# Patient Record
Sex: MALE | Race: WHITE | NOT HISPANIC OR LATINO | Employment: UNEMPLOYED | RURAL
[De-identification: names, ages, dates, MRNs, and addresses within clinical notes are randomized per-mention and may not be internally consistent; named-entity substitution may affect disease eponyms.]

---

## 2021-04-06 RX ORDER — COLCHICINE 0.6 MG/1
TABLET ORAL
Qty: 12 TABLET | Refills: 1 | Status: SHIPPED | OUTPATIENT
Start: 2021-04-06

## 2022-02-07 ENCOUNTER — HOSPITAL ENCOUNTER (OUTPATIENT)
Facility: HOSPITAL | Age: 67
Discharge: HOME | End: 2022-02-08
Attending: FAMILY MEDICINE | Admitting: HOSPITALIST
Payer: COMMERCIAL

## 2022-02-07 DIAGNOSIS — R07.9 CHEST PAIN: ICD-10-CM

## 2022-02-07 DIAGNOSIS — G56.00 CTS (CARPAL TUNNEL SYNDROME): ICD-10-CM

## 2022-02-07 DIAGNOSIS — E78.5 HYPERLIPIDEMIA, UNSPECIFIED HYPERLIPIDEMIA TYPE: ICD-10-CM

## 2022-02-07 DIAGNOSIS — R06.02 SOB (SHORTNESS OF BREATH): ICD-10-CM

## 2022-02-07 DIAGNOSIS — R07.9 CHEST PAIN AT REST: ICD-10-CM

## 2022-02-07 DIAGNOSIS — I25.10 CAD (CORONARY ARTERY DISEASE): ICD-10-CM

## 2022-02-07 DIAGNOSIS — G56.00 CARPAL TUNNEL SYNDROME, UNSPECIFIED LATERALITY: Primary | ICD-10-CM

## 2022-02-07 DIAGNOSIS — E78.5 HYPERLIPIDEMIA: ICD-10-CM

## 2022-02-07 DIAGNOSIS — I25.10 CORONARY ARTERY DISEASE, UNSPECIFIED VESSEL OR LESION TYPE, UNSPECIFIED WHETHER ANGINA PRESENT, UNSPECIFIED WHETHER NATIVE OR TRANSPLANTED HEART: ICD-10-CM

## 2022-02-07 LAB
ALBUMIN SERPL BCP-MCNC: 3.9 G/DL (ref 3.5–5)
ALBUMIN/GLOB SERPL: 1.1 {RATIO}
ALP SERPL-CCNC: 107 U/L (ref 45–115)
ALT SERPL W P-5'-P-CCNC: 71 U/L (ref 16–61)
ANION GAP SERPL CALCULATED.3IONS-SCNC: 14 MMOL/L (ref 7–16)
APTT PPP: 28.2 SECONDS (ref 25.2–37.3)
AST SERPL W P-5'-P-CCNC: 57 U/L (ref 15–37)
BASOPHILS # BLD AUTO: 0.06 K/UL (ref 0–0.2)
BASOPHILS NFR BLD AUTO: 1 % (ref 0–1)
BILIRUB SERPL-MCNC: 1 MG/DL (ref 0–1.2)
BILIRUB UR QL STRIP: ABNORMAL
BUN SERPL-MCNC: 13 MG/DL (ref 7–18)
BUN/CREAT SERPL: 11 (ref 6–20)
CALCIUM SERPL-MCNC: 9.1 MG/DL (ref 8.5–10.1)
CHLORIDE SERPL-SCNC: 104 MMOL/L (ref 98–107)
CLARITY UR: CLEAR
CO2 SERPL-SCNC: 25 MMOL/L (ref 21–32)
COLOR UR: YELLOW
CREAT SERPL-MCNC: 1.15 MG/DL (ref 0.7–1.3)
DIFFERENTIAL METHOD BLD: ABNORMAL
EOSINOPHIL # BLD AUTO: 0.16 K/UL (ref 0–0.5)
EOSINOPHIL NFR BLD AUTO: 2.7 % (ref 1–4)
ERYTHROCYTE [DISTWIDTH] IN BLOOD BY AUTOMATED COUNT: 12.7 % (ref 11.5–14.5)
FLUAV AG UPPER RESP QL IA.RAPID: NEGATIVE
FLUBV AG UPPER RESP QL IA.RAPID: NEGATIVE
GLOBULIN SER-MCNC: 3.7 G/DL (ref 2–4)
GLUCOSE SERPL-MCNC: 116 MG/DL (ref 74–106)
GLUCOSE UR STRIP-MCNC: NEGATIVE MG/DL
HCT VFR BLD AUTO: 46.2 % (ref 40–54)
HGB BLD-MCNC: 16.1 G/DL (ref 13.5–18)
IMM GRANULOCYTES # BLD AUTO: 0.01 K/UL (ref 0–0.04)
IMM GRANULOCYTES NFR BLD: 0.2 % (ref 0–0.4)
INR BLD: 1 (ref 0.9–1.1)
KETONES UR STRIP-SCNC: 15 MG/DL
LEUKOCYTE ESTERASE UR QL STRIP: NEGATIVE
LYMPHOCYTES # BLD AUTO: 1.44 K/UL (ref 1–4.8)
LYMPHOCYTES NFR BLD AUTO: 24 % (ref 27–41)
MAGNESIUM SERPL-MCNC: 2 MG/DL (ref 1.7–2.3)
MCH RBC QN AUTO: 31.9 PG (ref 27–31)
MCHC RBC AUTO-ENTMCNC: 34.8 G/DL (ref 32–36)
MCV RBC AUTO: 91.7 FL (ref 80–96)
MONOCYTES # BLD AUTO: 0.56 K/UL (ref 0–0.8)
MONOCYTES NFR BLD AUTO: 9.3 % (ref 2–6)
MPC BLD CALC-MCNC: 9.8 FL (ref 9.4–12.4)
NEUTROPHILS # BLD AUTO: 3.77 K/UL (ref 1.8–7.7)
NEUTROPHILS NFR BLD AUTO: 62.8 % (ref 53–65)
NITRITE UR QL STRIP: NEGATIVE
NRBC # BLD AUTO: 0 X10E3/UL
NRBC, AUTO (.00): 0 %
NT-PROBNP SERPL-MCNC: 98 PG/ML (ref 1–125)
PH UR STRIP: 5.5 PH UNITS
PLATELET # BLD AUTO: 146 K/UL (ref 150–400)
POTASSIUM SERPL-SCNC: 4 MMOL/L (ref 3.5–5.1)
PROT SERPL-MCNC: 7.6 G/DL (ref 6.4–8.2)
PROT UR QL STRIP: NEGATIVE
PROTHROMBIN TIME: 13.2 SECONDS (ref 11.7–14.7)
RBC # BLD AUTO: 5.04 M/UL (ref 4.6–6.2)
RBC # UR STRIP: NEGATIVE /UL
SARS-COV+SARS-COV-2 AG RESP QL IA.RAPID: NEGATIVE
SODIUM SERPL-SCNC: 139 MMOL/L (ref 136–145)
SP GR UR STRIP: >=1.03
TROPONIN I SERPL HS-MCNC: 6 PG/ML
TROPONIN I SERPL HS-MCNC: 6.1 PG/ML
TROPONIN I SERPL HS-MCNC: 6.6 PG/ML
UROBILINOGEN UR STRIP-ACNC: 0.2 MG/DL
WBC # BLD AUTO: 6 K/UL (ref 4.5–11)

## 2022-02-07 PROCEDURE — 85025 COMPLETE CBC W/AUTO DIFF WBC: CPT | Performed by: FAMILY MEDICINE

## 2022-02-07 PROCEDURE — 93010 ELECTROCARDIOGRAM REPORT: CPT | Mod: ,,, | Performed by: INTERNAL MEDICINE

## 2022-02-07 PROCEDURE — 99220 PR INITIAL OBSERVATION CARE,LEVL III: ICD-10-PCS | Mod: ,,, | Performed by: HOSPITALIST

## 2022-02-07 PROCEDURE — 81003 URINALYSIS AUTO W/O SCOPE: CPT | Performed by: REGISTERED NURSE

## 2022-02-07 PROCEDURE — 99284 EMERGENCY DEPT VISIT MOD MDM: CPT | Mod: ,,, | Performed by: FAMILY MEDICINE

## 2022-02-07 PROCEDURE — 99284 PR EMERGENCY DEPT VISIT,LEVEL IV: ICD-10-PCS | Mod: ,,, | Performed by: FAMILY MEDICINE

## 2022-02-07 PROCEDURE — 83880 ASSAY OF NATRIURETIC PEPTIDE: CPT | Performed by: FAMILY MEDICINE

## 2022-02-07 PROCEDURE — 63600175 PHARM REV CODE 636 W HCPCS: Performed by: REGISTERED NURSE

## 2022-02-07 PROCEDURE — 99220 PR INITIAL OBSERVATION CARE,LEVL III: CPT | Mod: ,,, | Performed by: HOSPITALIST

## 2022-02-07 PROCEDURE — 99285 EMERGENCY DEPT VISIT HI MDM: CPT | Mod: 25 | Performed by: FAMILY MEDICINE

## 2022-02-07 PROCEDURE — 87428 SARSCOV & INF VIR A&B AG IA: CPT | Performed by: FAMILY MEDICINE

## 2022-02-07 PROCEDURE — 83735 ASSAY OF MAGNESIUM: CPT | Performed by: FAMILY MEDICINE

## 2022-02-07 PROCEDURE — G0378 HOSPITAL OBSERVATION PER HR: HCPCS

## 2022-02-07 PROCEDURE — 96372 THER/PROPH/DIAG INJ SC/IM: CPT | Performed by: REGISTERED NURSE

## 2022-02-07 PROCEDURE — 93005 ELECTROCARDIOGRAM TRACING: CPT

## 2022-02-07 PROCEDURE — 25000003 PHARM REV CODE 250: Performed by: FAMILY MEDICINE

## 2022-02-07 PROCEDURE — 85730 THROMBOPLASTIN TIME PARTIAL: CPT | Performed by: REGISTERED NURSE

## 2022-02-07 PROCEDURE — 36415 COLL VENOUS BLD VENIPUNCTURE: CPT | Performed by: FAMILY MEDICINE

## 2022-02-07 PROCEDURE — 84484 ASSAY OF TROPONIN QUANT: CPT | Performed by: FAMILY MEDICINE

## 2022-02-07 PROCEDURE — 85610 PROTHROMBIN TIME: CPT | Performed by: REGISTERED NURSE

## 2022-02-07 PROCEDURE — 84484 ASSAY OF TROPONIN QUANT: CPT | Mod: 91 | Performed by: REGISTERED NURSE

## 2022-02-07 PROCEDURE — 93010 EKG 12-LEAD: ICD-10-PCS | Mod: 76,,, | Performed by: INTERNAL MEDICINE

## 2022-02-07 PROCEDURE — 80053 COMPREHEN METABOLIC PANEL: CPT | Performed by: FAMILY MEDICINE

## 2022-02-07 RX ORDER — ATORVASTATIN CALCIUM 40 MG/1
40 TABLET, FILM COATED ORAL DAILY
Status: DISCONTINUED | OUTPATIENT
Start: 2022-02-08 | End: 2022-02-08

## 2022-02-07 RX ORDER — ASPIRIN 325 MG
325 TABLET ORAL
Status: COMPLETED | OUTPATIENT
Start: 2022-02-07 | End: 2022-02-07

## 2022-02-07 RX ORDER — HYDROCODONE BITARTRATE AND ACETAMINOPHEN 5; 325 MG/1; MG/1
1 TABLET ORAL EVERY 4 HOURS PRN
Status: DISCONTINUED | OUTPATIENT
Start: 2022-02-07 | End: 2022-02-09 | Stop reason: HOSPADM

## 2022-02-07 RX ORDER — ACETAMINOPHEN 500 MG
1000 TABLET ORAL EVERY 8 HOURS PRN
Status: DISCONTINUED | OUTPATIENT
Start: 2022-02-07 | End: 2022-02-09 | Stop reason: HOSPADM

## 2022-02-07 RX ORDER — NITROGLYCERIN 0.4 MG/1
0.4 TABLET SUBLINGUAL EVERY 5 MIN PRN
Status: DISCONTINUED | OUTPATIENT
Start: 2022-02-07 | End: 2022-02-09 | Stop reason: HOSPADM

## 2022-02-07 RX ORDER — ENOXAPARIN SODIUM 100 MG/ML
40 INJECTION SUBCUTANEOUS ONCE
Status: COMPLETED | OUTPATIENT
Start: 2022-02-07 | End: 2022-02-07

## 2022-02-07 RX ORDER — SODIUM CHLORIDE 0.9 % (FLUSH) 0.9 %
3 SYRINGE (ML) INJECTION
Status: DISCONTINUED | OUTPATIENT
Start: 2022-02-07 | End: 2022-02-09 | Stop reason: HOSPADM

## 2022-02-07 RX ORDER — ONDANSETRON 2 MG/ML
4 INJECTION INTRAMUSCULAR; INTRAVENOUS EVERY 8 HOURS PRN
Status: DISCONTINUED | OUTPATIENT
Start: 2022-02-07 | End: 2022-02-09 | Stop reason: HOSPADM

## 2022-02-07 RX ORDER — TALC
6 POWDER (GRAM) TOPICAL NIGHTLY PRN
Status: DISCONTINUED | OUTPATIENT
Start: 2022-02-07 | End: 2022-02-09 | Stop reason: HOSPADM

## 2022-02-07 RX ADMIN — ENOXAPARIN SODIUM 40 MG: 40 INJECTION SUBCUTANEOUS at 07:02

## 2022-02-07 RX ADMIN — ASPIRIN 325 MG ORAL TABLET 325 MG: 325 PILL ORAL at 03:02

## 2022-02-07 NOTE — ED PROVIDER NOTES
Encounter Date: 2/7/2022    SCRIBE #1 NOTE: I, Bre Angela, am scribing for, and in the presence of,  Kary Garcia MD. I have scribed the entire note.       History     Chief Complaint   Patient presents with    Shortness of Breath    Nausea     Patient is a 66 year old male who presents to the emergency department due to sudden onset diaphoresis and nausea that onset 40 minutes prior to arrival. Patient explains that he woke up and began to feel some neck soreness and assuming that he just slept on it wrong he took some tylenol, ate breakfast, and continued to rest. After eating lunch this afternoon the patient went to the restroom where he began to experience diarrhea. He then started to feel nauseated, clammy, experiencing rapid breathing, and felt his arms tingle. Patient called his wife, Dr. Rayo, who advised him to come into the ED. Patient has a surgical history of a quadruple bypass surgery that occurred 10+ years ago. He explains that the symptoms he felt today are not similar to that he experienced prior to the bypass.  Patient takes a baby Asprin every few days. Patient recently moved to the [facility] and has yet to find a new cardiologist.     The history is provided by the patient. No  was used.     Review of patient's allergies indicates:  No Known Allergies  Past Medical History:   Diagnosis Date    Hyperlipidemia     Hypertension      Past Surgical History:   Procedure Laterality Date    ACHILLES TENDON SURGERY      CORONARY ARTERY BYPASS GRAFT      TONSILLECTOMY       Family History   Problem Relation Age of Onset    Heart attack Father      Social History     Tobacco Use    Smoking status: Former Smoker    Smokeless tobacco: Never Used   Substance Use Topics    Alcohol use: Yes    Drug use: No     Review of Systems   Constitutional: Positive for diaphoresis.   HENT: Negative.    Eyes: Negative.    Respiratory:        Rapid breathing   Cardiovascular:  Negative.    Gastrointestinal: Positive for diarrhea and nausea. Negative for abdominal pain.   Endocrine: Negative.    Genitourinary: Negative.    Musculoskeletal: Negative.    Skin: Negative.    Allergic/Immunologic: Negative.    Neurological:        Tingling in arms   Hematological: Negative.    Psychiatric/Behavioral: Negative.    All other systems reviewed and are negative.      Physical Exam     Initial Vitals [02/07/22 1454]   BP Pulse Resp Temp SpO2   128/73 (!) 59 16 98.7 °F (37.1 °C) 95 %      MAP       --         Physical Exam    Vitals reviewed.  Constitutional: He appears well-developed and well-nourished. No distress.   HENT:   Head: Normocephalic.   Eyes: Conjunctivae are normal. Pupils are equal, round, and reactive to light.   Cardiovascular: Normal rate, regular rhythm, normal heart sounds and intact distal pulses.   Pulmonary/Chest: Breath sounds normal.   Abdominal: Abdomen is soft. Bowel sounds are normal.     Neurological: He is alert and oriented to person, place, and time.   Skin: Skin is warm and dry.   Psychiatric: He has a normal mood and affect.         ED Course   Procedures  Labs Reviewed   COMPREHENSIVE METABOLIC PANEL - Abnormal; Notable for the following components:       Result Value    Glucose 116 (*)     ALT 71 (*)     AST 57 (*)     All other components within normal limits   CBC WITH DIFFERENTIAL - Abnormal; Notable for the following components:    MCH 31.9 (*)     Platelet Count 146 (*)     Lymphocytes % 24.0 (*)     Monocytes % 9.3 (*)     All other components within normal limits   TROPONIN I - Normal   NT-PRO NATRIURETIC PEPTIDE - Normal   MAGNESIUM - Normal   CBC W/ AUTO DIFFERENTIAL    Narrative:     The following orders were created for panel order CBC auto differential.  Procedure                               Abnormality         Status                     ---------                               -----------         ------                     CBC with  Differential[[id number]]        Abnormal            Final result                 Please view results for these tests on the individual orders.   EXTRA TUBES    Narrative:     The following orders were created for panel order EXTRA TUBES.  Procedure                               Abnormality         Status                     ---------                               -----------         ------                     Red Top Hold[[id number]]                                     In process                   Please view results for these tests on the individual orders.   RED TOP HOLD   TROPONIN I   SARS-COV2 (COVID) W/ FLU ANTIGEN          Imaging Results          X-Ray Chest AP Portable (Final result)  Result time 02/07/22 15:06:11    Final result by Jack Grande MD (02/07/22 15:06:11)                 Impression:      No acute cardiopulmonary process      Electronically signed by: Jack Grande  Date:    02/07/2022  Time:    15:06             Narrative:    EXAMINATION:  XR CHEST AP PORTABLE    CLINICAL HISTORY:  Chest Pain;.    TECHNIQUE:  AP portable erect chest    COMPARISON:  No previous similar    FINDINGS:  Cardiac silhouette is not enlarged.  There is no mediastinal mass.  Prior median sternotomy.  There is no pulmonary vascular engorgement.    Lungs and pleural spaces are clear.    There is no acute osseous abnormality                                 Medications   aspirin tablet 325 mg (325 mg Oral Given 2/7/22 1540)     Medical Decision Making:   Other:   I have discussed this case with another health care provider.       <> Summary of the Discussion: I discussed case with Dr. Beauchamp who believes patient needs a heart catheterization tomorrow.  Discussed case with Dr. Kasper who agrees to admission.  Patient cannot tolerate a beta-blocker per wife who is also physician.  He is currently asymptomatic.             Attending Attestation:           Physician Attestation for Scribe:  Physician Attestation  Statement for Scribe #1: I, Kary Garcia M.D., reviewed documentation, as scribed by Bre Miller in my presence, and it is both accurate and complete.                      Clinical Impression:   Final diagnoses:  [R07.9] Chest pain          ED Disposition Condition    Observation               Kary Chpaarro MD  02/07/22 8349

## 2022-02-07 NOTE — Clinical Note
180 ml of contrast were injected throughout the case. 20 mL of contrast was the total wasted during the case. 200 mL was the total amount used during the case.

## 2022-02-07 NOTE — Clinical Note
The catheter was repositioned into the ostial SVG graft. An angiography was performed of the m-d RCA. Multiple views were taken.

## 2022-02-07 NOTE — Clinical Note
The catheter was inserted over the wire into the aorta. The catheter was unable to access the area..RCA graft. Removed over the wire.

## 2022-02-07 NOTE — Clinical Note
The catheter was inserted over the wire into the aorta. An angiography was performed of the aorta. The angiography was performed via power injection. The injected amount was 45 mL contrast at 14 mL/s. The PSI from the power injection was 400. Catheter removed over wire.

## 2022-02-07 NOTE — Clinical Note
The catheter was reinserted into the ostial LYUBOV graft. An angiography was performed of the Free LYUBOV. Multiple views were taken.

## 2022-02-07 NOTE — Clinical Note
The catheter was inserted over the wire into the left ventricle. Hemodynamics were performed.  and Pullback was recorded.  Catheter was then removed.

## 2022-02-07 NOTE — Clinical Note
The catheter was repositioned into the ostial LIMA graft. An angiography was performed of the m-d LAD.

## 2022-02-07 NOTE — ED TRIAGE NOTES
Presents to ED via EMS from home for c/o SOB, nausea and bilateral arm tingling that began at home while using the bathroom. Zofran given by EMS and patient reports symptoms have resolved.

## 2022-02-07 NOTE — Clinical Note
The catheter was inserted over the wire into the ostium   left main. An angiography was performed of the left coronary arteries. Multiple views were taken. Catheter was then removed.

## 2022-02-07 NOTE — Clinical Note
The catheter was repositioned into the aorta. The catheter was unable to engage the area..RCA graft. Catheter removed over wire.

## 2022-02-07 NOTE — Clinical Note
The right groin was prepped. The site was prepped with ChloraPrep. The site was clipped. The patient was draped. The patient was positioned supine.

## 2022-02-07 NOTE — Clinical Note
A percutaneous stick to the right femoral artery was performed. Ultrasound guidance was used to obtain access.

## 2022-02-07 NOTE — Clinical Note
The catheter was repositioned into the aorta. The catheter was unable to engage the area..RCA graft. Removed over wire.

## 2022-02-07 NOTE — Clinical Note
The catheter was repositioned into the right subclavian artery LYUBOV graft. An angiography was performed of the Free LYUBOV. Multiple views were taken.

## 2022-02-08 VITALS
TEMPERATURE: 98 F | HEART RATE: 56 BPM | HEIGHT: 72 IN | DIASTOLIC BLOOD PRESSURE: 67 MMHG | WEIGHT: 220 LBS | BODY MASS INDEX: 29.8 KG/M2 | RESPIRATION RATE: 18 BRPM | OXYGEN SATURATION: 96 % | SYSTOLIC BLOOD PRESSURE: 123 MMHG

## 2022-02-08 PROBLEM — G56.00 CTS (CARPAL TUNNEL SYNDROME): Status: ACTIVE | Noted: 2022-02-08

## 2022-02-08 LAB
ALBUMIN SERPL BCP-MCNC: 3.6 G/DL (ref 3.5–5)
ALBUMIN/GLOB SERPL: 1.1 {RATIO}
ALP SERPL-CCNC: 100 U/L (ref 45–115)
ALT SERPL W P-5'-P-CCNC: 59 U/L (ref 16–61)
ANION GAP SERPL CALCULATED.3IONS-SCNC: 14 MMOL/L (ref 7–16)
AST SERPL W P-5'-P-CCNC: 37 U/L (ref 15–37)
AV INDEX (PROSTH): 0.64
AV VALVE AREA: 2.89 CM2
BASOPHILS # BLD AUTO: 0.06 K/UL (ref 0–0.2)
BASOPHILS NFR BLD AUTO: 1.1 % (ref 0–1)
BILIRUB SERPL-MCNC: 1.2 MG/DL (ref 0–1.2)
BSA FOR ECHO PROCEDURE: 2.25 M2
BUN SERPL-MCNC: 13 MG/DL (ref 7–18)
BUN/CREAT SERPL: 12 (ref 6–20)
CALCIUM SERPL-MCNC: 9 MG/DL (ref 8.5–10.1)
CHLORIDE SERPL-SCNC: 105 MMOL/L (ref 98–107)
CHOLEST SERPL-MCNC: 182 MG/DL (ref 0–200)
CHOLEST/HDLC SERPL: 3.4 {RATIO}
CO2 SERPL-SCNC: 26 MMOL/L (ref 21–32)
CREAT SERPL-MCNC: 1.13 MG/DL (ref 0.7–1.3)
CV ECHO LV RWT: 0.44 CM
DIFFERENTIAL METHOD BLD: ABNORMAL
DOP CALC AO VTI: 28.93 CM
DOP CALC LVOT AREA: 4.5 CM2
DOP CALC LVOT DIAMETER: 2.4 CM
DOP CALC LVOT STROKE VOLUME: 83.69 CM3
DOP CALCLVOT PEAK VEL VTI: 18.51 CM
ECHO LV POSTERIOR WALL: 1.1 CM (ref 0.6–1.1)
EJECTION FRACTION: 50 %
EOSINOPHIL # BLD AUTO: 0.18 K/UL (ref 0–0.5)
EOSINOPHIL NFR BLD AUTO: 3.2 % (ref 1–4)
ERYTHROCYTE [DISTWIDTH] IN BLOOD BY AUTOMATED COUNT: 12.8 % (ref 11.5–14.5)
FRACTIONAL SHORTENING: 22 % (ref 28–44)
GLOBULIN SER-MCNC: 3.2 G/DL (ref 2–4)
GLUCOSE SERPL-MCNC: 103 MG/DL (ref 74–106)
HCT VFR BLD AUTO: 44.4 % (ref 40–54)
HDLC SERPL-MCNC: 53 MG/DL (ref 40–60)
HGB BLD-MCNC: 15.3 G/DL (ref 13.5–18)
IMM GRANULOCYTES # BLD AUTO: 0.01 K/UL (ref 0–0.04)
IMM GRANULOCYTES NFR BLD: 0.2 % (ref 0–0.4)
INTERVENTRICULAR SEPTUM: 1.1 CM (ref 0.6–1.1)
IVC DIAMETER: 1.7 CM
LDLC SERPL CALC-MCNC: 107 MG/DL
LDLC/HDLC SERPL: 2 {RATIO}
LEFT ATRIUM SIZE: 5.2 CM
LEFT INTERNAL DIMENSION IN SYSTOLE: 3.9 CM (ref 2.1–4)
LEFT VENTRICLE MASS INDEX: 93 G/M2
LEFT VENTRICULAR INTERNAL DIMENSION IN DIASTOLE: 5 CM (ref 3.5–6)
LEFT VENTRICULAR MASS: 207.14 G
LYMPHOCYTES # BLD AUTO: 2.11 K/UL (ref 1–4.8)
LYMPHOCYTES NFR BLD AUTO: 37.8 % (ref 27–41)
MCH RBC QN AUTO: 31.9 PG (ref 27–31)
MCHC RBC AUTO-ENTMCNC: 34.5 G/DL (ref 32–36)
MCV RBC AUTO: 92.7 FL (ref 80–96)
MONOCYTES # BLD AUTO: 0.55 K/UL (ref 0–0.8)
MONOCYTES NFR BLD AUTO: 9.9 % (ref 2–6)
MPC BLD CALC-MCNC: 10.2 FL (ref 9.4–12.4)
NEUTROPHILS # BLD AUTO: 2.67 K/UL (ref 1.8–7.7)
NEUTROPHILS NFR BLD AUTO: 47.8 % (ref 53–65)
NONHDLC SERPL-MCNC: 129 MG/DL
NRBC # BLD AUTO: 0 X10E3/UL
NRBC, AUTO (.00): 0 %
PLATELET # BLD AUTO: 147 K/UL (ref 150–400)
POTASSIUM SERPL-SCNC: 4.6 MMOL/L (ref 3.5–5.1)
PROT SERPL-MCNC: 6.8 G/DL (ref 6.4–8.2)
RBC # BLD AUTO: 4.79 M/UL (ref 4.6–6.2)
SODIUM SERPL-SCNC: 140 MMOL/L (ref 136–145)
T4 SERPL-MCNC: 7.2 ΜG/DL (ref 4.5–12.1)
TRIGL SERPL-MCNC: 109 MG/DL (ref 35–150)
TROPONIN I SERPL HS-MCNC: 6.3 PG/ML
TSH SERPL DL<=0.005 MIU/L-ACNC: 2.54 UIU/ML (ref 0.36–3.74)
VLDLC SERPL-MCNC: 22 MG/DL
WBC # BLD AUTO: 5.58 K/UL (ref 4.5–11)

## 2022-02-08 PROCEDURE — 27000080 OPTIME MED/SURG SUP & DEVICES GENERAL CLASSIFICATION: Performed by: STUDENT IN AN ORGANIZED HEALTH CARE EDUCATION/TRAINING PROGRAM

## 2022-02-08 PROCEDURE — 99152 MOD SED SAME PHYS/QHP 5/>YRS: CPT | Performed by: STUDENT IN AN ORGANIZED HEALTH CARE EDUCATION/TRAINING PROGRAM

## 2022-02-08 PROCEDURE — 93459 L HRT ART/GRFT ANGIO: CPT | Mod: 26,,, | Performed by: STUDENT IN AN ORGANIZED HEALTH CARE EDUCATION/TRAINING PROGRAM

## 2022-02-08 PROCEDURE — 80061 LIPID PANEL: CPT | Performed by: REGISTERED NURSE

## 2022-02-08 PROCEDURE — 93458 L HRT ARTERY/VENTRICLE ANGIO: CPT | Performed by: STUDENT IN AN ORGANIZED HEALTH CARE EDUCATION/TRAINING PROGRAM

## 2022-02-08 PROCEDURE — 93459 L HRT ART/GRFT ANGIO: CPT | Performed by: STUDENT IN AN ORGANIZED HEALTH CARE EDUCATION/TRAINING PROGRAM

## 2022-02-08 PROCEDURE — 27100168 OPTIME MED/SURG SUP & DEVICES NON-STERILE SUPPLY: Performed by: STUDENT IN AN ORGANIZED HEALTH CARE EDUCATION/TRAINING PROGRAM

## 2022-02-08 PROCEDURE — 84484 ASSAY OF TROPONIN QUANT: CPT | Performed by: REGISTERED NURSE

## 2022-02-08 PROCEDURE — 99217 PR OBSERVATION CARE DISCHARGE: ICD-10-PCS | Mod: ,,, | Performed by: HOSPITALIST

## 2022-02-08 PROCEDURE — C1760 CLOSURE DEV, VASC: HCPCS | Performed by: STUDENT IN AN ORGANIZED HEALTH CARE EDUCATION/TRAINING PROGRAM

## 2022-02-08 PROCEDURE — 27800903 OPTIME MED/SURG SUP & DEVICES OTHER IMPLANTS: Performed by: STUDENT IN AN ORGANIZED HEALTH CARE EDUCATION/TRAINING PROGRAM

## 2022-02-08 PROCEDURE — 99217 PR OBSERVATION CARE DISCHARGE: CPT | Mod: ,,, | Performed by: HOSPITALIST

## 2022-02-08 PROCEDURE — 25000003 PHARM REV CODE 250: Performed by: HOSPITALIST

## 2022-02-08 PROCEDURE — 84436 ASSAY OF TOTAL THYROXINE: CPT | Performed by: HOSPITALIST

## 2022-02-08 PROCEDURE — 80053 COMPREHEN METABOLIC PANEL: CPT | Performed by: REGISTERED NURSE

## 2022-02-08 PROCEDURE — 85025 COMPLETE CBC W/AUTO DIFF WBC: CPT | Performed by: REGISTERED NURSE

## 2022-02-08 PROCEDURE — 25000003 PHARM REV CODE 250: Performed by: FAMILY MEDICINE

## 2022-02-08 PROCEDURE — 93459: ICD-10-PCS | Mod: 26,,, | Performed by: STUDENT IN AN ORGANIZED HEALTH CARE EDUCATION/TRAINING PROGRAM

## 2022-02-08 PROCEDURE — 84443 ASSAY THYROID STIM HORMONE: CPT | Performed by: HOSPITALIST

## 2022-02-08 PROCEDURE — 25000003 PHARM REV CODE 250: Performed by: STUDENT IN AN ORGANIZED HEALTH CARE EDUCATION/TRAINING PROGRAM

## 2022-02-08 PROCEDURE — 99153 MOD SED SAME PHYS/QHP EA: CPT | Performed by: STUDENT IN AN ORGANIZED HEALTH CARE EDUCATION/TRAINING PROGRAM

## 2022-02-08 PROCEDURE — G0378 HOSPITAL OBSERVATION PER HR: HCPCS

## 2022-02-08 PROCEDURE — 25500020 PHARM REV CODE 255: Performed by: STUDENT IN AN ORGANIZED HEALTH CARE EDUCATION/TRAINING PROGRAM

## 2022-02-08 PROCEDURE — 25000003 PHARM REV CODE 250: Performed by: REGISTERED NURSE

## 2022-02-08 PROCEDURE — 63600175 PHARM REV CODE 636 W HCPCS: Performed by: STUDENT IN AN ORGANIZED HEALTH CARE EDUCATION/TRAINING PROGRAM

## 2022-02-08 PROCEDURE — C1894 INTRO/SHEATH, NON-LASER: HCPCS | Performed by: STUDENT IN AN ORGANIZED HEALTH CARE EDUCATION/TRAINING PROGRAM

## 2022-02-08 PROCEDURE — 36415 COLL VENOUS BLD VENIPUNCTURE: CPT | Performed by: REGISTERED NURSE

## 2022-02-08 PROCEDURE — S5010 5% DEXTROSE AND 0.45% SALINE: HCPCS | Performed by: HOSPITALIST

## 2022-02-08 PROCEDURE — 27201423 OPTIME MED/SURG SUP & DEVICES STERILE SUPPLY: Performed by: STUDENT IN AN ORGANIZED HEALTH CARE EDUCATION/TRAINING PROGRAM

## 2022-02-08 RX ORDER — EZETIMIBE 10 MG/1
10 TABLET ORAL NIGHTLY
Status: DISCONTINUED | OUTPATIENT
Start: 2022-02-08 | End: 2022-02-08

## 2022-02-08 RX ORDER — LIDOCAINE HYDROCHLORIDE 10 MG/ML
INJECTION INFILTRATION; PERINEURAL
Status: DISCONTINUED | OUTPATIENT
Start: 2022-02-08 | End: 2022-02-08 | Stop reason: HOSPADM

## 2022-02-08 RX ORDER — SODIUM CHLORIDE 450 MG/100ML
INJECTION, SOLUTION INTRAVENOUS
Status: DISCONTINUED | OUTPATIENT
Start: 2022-02-08 | End: 2022-02-09 | Stop reason: HOSPADM

## 2022-02-08 RX ORDER — HYDRALAZINE HYDROCHLORIDE 20 MG/ML
INJECTION INTRAMUSCULAR; INTRAVENOUS
Status: DISCONTINUED | OUTPATIENT
Start: 2022-02-08 | End: 2022-02-08 | Stop reason: HOSPADM

## 2022-02-08 RX ORDER — MIDAZOLAM HYDROCHLORIDE 1 MG/ML
INJECTION INTRAMUSCULAR; INTRAVENOUS
Status: DISCONTINUED | OUTPATIENT
Start: 2022-02-08 | End: 2022-02-08 | Stop reason: HOSPADM

## 2022-02-08 RX ORDER — NAPROXEN SODIUM 220 MG/1
81 TABLET, FILM COATED ORAL DAILY
Status: DISCONTINUED | OUTPATIENT
Start: 2022-02-08 | End: 2022-02-09 | Stop reason: HOSPADM

## 2022-02-08 RX ORDER — EZETIMIBE 10 MG/1
10 TABLET ORAL NIGHTLY
Status: DISCONTINUED | OUTPATIENT
Start: 2022-02-08 | End: 2022-02-09 | Stop reason: HOSPADM

## 2022-02-08 RX ORDER — DEXTROSE MONOHYDRATE AND SODIUM CHLORIDE 5; .45 G/100ML; G/100ML
INJECTION, SOLUTION INTRAVENOUS CONTINUOUS
Status: DISCONTINUED | OUTPATIENT
Start: 2022-02-08 | End: 2022-02-09 | Stop reason: HOSPADM

## 2022-02-08 RX ORDER — FENTANYL CITRATE 50 UG/ML
INJECTION, SOLUTION INTRAMUSCULAR; INTRAVENOUS
Status: DISCONTINUED | OUTPATIENT
Start: 2022-02-08 | End: 2022-02-08 | Stop reason: HOSPADM

## 2022-02-08 RX ORDER — EZETIMIBE 10 MG/1
10 TABLET ORAL NIGHTLY
Qty: 30 TABLET | Refills: 0 | Status: SHIPPED | OUTPATIENT
Start: 2022-02-08 | End: 2023-02-08

## 2022-02-08 RX ADMIN — ASPIRIN 81 MG 81 MG: 81 TABLET ORAL at 02:02

## 2022-02-08 RX ADMIN — ATORVASTATIN CALCIUM 40 MG: 40 TABLET, FILM COATED ORAL at 08:02

## 2022-02-08 RX ADMIN — DEXTROSE AND SODIUM CHLORIDE: 5; 450 INJECTION, SOLUTION INTRAVENOUS at 02:02

## 2022-02-08 RX ADMIN — EZETIMIBE 10 MG: 10 TABLET ORAL at 09:02

## 2022-02-08 NOTE — ASSESSMENT & PLAN NOTE
65 y/o male with a past medical history of CAD ( S/P CABG 2011) HLD, admitted to from [facility] ED complaining of a sudden onset of diaphoresis and nausea.  His symptoms are similar to those he experienced when he was found to have CAD requiring Cabg. Cardiology consulted and plans [facility] in a.m. Troponin negative in ED 6.1. Repeat troponin. Echo pending. Pt reports no BB currently because he can't tolerate them. HR currently 55 in ed.  mg in ed.  * Telemetry Monitoring    * Routine vitals    * Cardiology consulted:    * NPO after midnight   * CBC, CMP, Troponin x3, Lipids, Pt/Inr, Ptt   * Cxr: There is no acute osseous abnormality  * 02 prn maintain sPO2 >92%    * Dvt ppx ; Lovenox 40 mg once  * NTG  Prn for chest pain  *  mg in ED  * Atorvastatin 80 mg qhs   * Hx of CAD: s/p CABG about 10 years ago [facility]  * Echo pending   * Lipid panel ordered   * EKG :    # AUGUSTIN score of 4

## 2022-02-08 NOTE — PROGRESS NOTES
[address] Medicine  Progress Note    Patient Name: Antoni Rayo  MRN: [id number]  Patient Class: OP- Observation   Admission Date: 2/7/2022  Length of Stay: 0 days  Attending Physician: Shimon Kasper MD  Primary Care Provider: Primary Doctor No        Subjective:     Principal Problem:CAD (coronary artery disease)        HPI:  65 y/o male with a past medical history of CAD ( S/P CABG 2011) HLD, admitted to from [facility] ED complaining of a sudden onset of diaphoresis and nausea. The pt reports this morning after waking up he felt soreness in his neck and took Tylenol for his discomfort. He assumed his soreness was from sleeping wrong last night. This afternoon he ate lunch he went to the restroom and had one episode of diarrhea with nausea and diaphoresis. He also reports tachypnea and numbness in his arms. He states his symptoms were similar to what he felt before he had his CABG. He denies SOB, chest pain, abdominal pain, headache, vision disturbance, dizziness or recent illness. Upon exam the pt denies SOB, CP or nausea. He states he has had no other episodes similar to earlier. His wife, who is a physician at [facility], is at bedside and reports his symptoms are similar to when he was found to have CAD requiring CABG. She reports he had a negative LHC about one year after his CABG. He was followed by Cardiologist  in [place] and Dr. Kasper at [place] however, he has not had a established heart doctor since Dr. Kasper's death. The pt reports alcohol use daily. Denies tobacco use. He is vaccinated for Covid.                   Overview/Hospital Course:  No notes on file    Interval History: Pt denies SOB, CP or any symptoms similar to what brought him in.     Review of Systems   Constitutional: Negative for fever and unexpected weight change.   HENT: Negative for congestion.    Eyes: Negative for visual disturbance.   Respiratory: Negative for chest tightness and  shortness of breath.    Cardiovascular: Negative for chest pain, palpitations and leg swelling.   Gastrointestinal: Negative for abdominal pain, blood in stool and vomiting.   Neurological: Negative for dizziness, syncope, facial asymmetry, speech difficulty, light-headedness and headaches.   All other systems reviewed and are negative.    Objective:     Vital Signs (Most Recent):  Temp: 98.3 °F (36.8 °C) (02/08/22 0730)  Pulse: (!) 54 (02/08/22 0730)  Resp: 18 (02/08/22 0730)  BP: (!) 148/80 (02/08/22 0730)  SpO2: 97 % (02/08/22 0730) Vital Signs (24h Range):  Temp:  [97.8 °F (36.6 °C)-98.7 °F (37.1 °C)] 98.3 °F (36.8 °C)  Pulse:  [52-73] 54  Resp:  [12-24] 18  SpO2:  [94 %-99 %] 97 %  BP: (118-153)/(57-81) 148/80     Weight: 99.8 kg (220 lb 0.3 oz)  Body mass index is 29.84 kg/m².    Intake/Output Summary (Last 24 hours) at 2/8/2022 0940  Last data filed at 2/8/2022 0700  Gross per 24 hour   Intake 576.25 ml   Output --   Net 576.25 ml      Physical Exam  Vitals and nursing note reviewed.   Constitutional:       General: He is not in acute distress.     Appearance: Normal appearance. He is obese.   HENT:      Head: Normocephalic and atraumatic.      Nose: Nose normal.      Mouth/Throat:      Mouth: Mucous membranes are moist.   Eyes:      Extraocular Movements: Extraocular movements intact.      Pupils: Pupils are equal, round, and reactive to light.   Cardiovascular:      Rate and Rhythm: Normal rate and regular rhythm.      Pulses: Normal pulses.      Heart sounds: Normal heart sounds. No murmur heard.      Pulmonary:      Effort: Pulmonary effort is normal. No respiratory distress.      Breath sounds: Normal breath sounds.   Abdominal:      General: Bowel sounds are normal. There is no distension.      Palpations: Abdomen is soft.      Tenderness: There is no abdominal tenderness.   Musculoskeletal:         General: No swelling or signs of injury. Normal range of motion.      Cervical back: Normal range of  motion and neck supple.   Skin:     General: Skin is warm and dry.      Capillary Refill: Capillary refill takes less than 2 seconds.   Neurological:      General: No focal deficit present.      Mental Status: He is alert and oriented to person, place, and time.      GCS: GCS eye subscore is 4. GCS verbal subscore is 5. GCS motor subscore is 6.      Cranial Nerves: Cranial nerves are intact. No cranial nerve deficit.      Sensory: Sensation is intact. No sensory deficit.      Motor: Motor function is intact. No weakness.      Coordination: Coordination is intact.   Psychiatric:         Mood and Affect: Mood normal.         Behavior: Behavior normal.         Significant Labs: All pertinent labs within the past 24 hours have been reviewed.    Significant Imaging: I have reviewed all pertinent imaging results/findings within the past 24 hours.      Assessment/Plan:      * CAD (coronary artery disease)  65 y/o male with a past medical history of CAD ( S/P CABG 2011) HLD, admitted to from [facility] ED complaining of a sudden onset of diaphoresis and nausea.  His symptoms are similar to those he experienced when he was found to have CAD requiring Cabg. Cardiology consulted and plans [facility] in a.m. Troponin negative in ED 6.1. Repeat troponin. Echo pending. Pt reports no BB currently because he can't tolerate them. HR currently 55 in ed.  mg in ed.  * Telemetry Monitoring    * Routine vitals    * Cardiology consulted:    * NPO after midnight   * CBC, CMP, Troponin x3, Lipids, Pt/Inr, Ptt   * Cxr: There is no acute osseous abnormality  * 02 prn maintain sPO2 >92%    * Dvt ppx ; Lovenox 40 mg once  * NTG  Prn for chest pain  *  mg in ED  * Atorvastatin 80 mg qhs   * Hx of CAD: s/p CABG about 10 years ago [facility]  * Echo pending   * Lipid panel ordered   * EKG :  # AUGUSTIN score of 4   2/8: Denies CP or SOB. No similar symptoms as yesterday. LHC planned today.Troponin remain flat. Echo results  pending    Hyperlipidemia  2/7: Pt reports noncompliance with statin medications due to muscle pain while taking medications. Lipitor 80 mg qhs. Lipid panel pending  2/8:    Lab Results   Component Value Date    CHOL 182 02/08/2022     Lab Results   Component Value Date    HDL 53 02/08/2022     Lab Results   Component Value Date    LDLCALC 107 02/08/2022     Lab Results   Component Value Date    TRIG 109 02/08/2022     Lab Results   Component Value Date    CHOLHDL 3.4 02/08/2022           VTE Risk Mitigation (From admission, onward)         Ordered     Place sequential compression device  Until discontinued         02/07/22 1814     IP VTE LOW RISK PATIENT  Once         02/07/22 1814                Discharge Planning   ANUPAM:      Code Status: Full Code   Is the patient medically ready for discharge?:     Reason for patient still in hospital (select all that apply): Treatment                     HILDA Mccormick  Department of Hospital Medicine   [address]

## 2022-02-08 NOTE — PROGRESS NOTES
OT screen completed. Pt reports numbness to BUE has resolved. Pt awaiting [facility]. Pt independent with all ADL tasks, ADL t/f, and functional mobility. No further OT services indicated at this time.

## 2022-02-08 NOTE — ASSESSMENT & PLAN NOTE
67 y/o male with a past medical history of CAD ( S/P CABG 2011) HLD, admitted to from [facility] ED complaining of a sudden onset of diaphoresis and nausea.  His symptoms are similar to those he experienced when he was found to have CAD requiring Cabg. Cardiology consulted and plans LHC in a.m. Troponin negative in ED 6.1. Repeat troponin. Echo pending. Pt reports no BB currently because he can't tolerate them. HR currently 55 in ed.  mg in ed.  * Telemetry Monitoring    * Routine vitals    * Cardiology consulted:    * NPO after midnight   * CBC, CMP, Troponin x3, Lipids, Pt/Inr, Ptt   * Cxr: There is no acute osseous abnormality  * 02 prn maintain sPO2 >92%    * Dvt ppx ; Lovenox 40 mg once  * NTG  Prn for chest pain  *  mg in ED  * Atorvastatin 80 mg qhs   * Hx of CAD: s/p CABG about 10 years ago [facility]  * Echo pending   * Lipid panel ordered   * EKG :  # AUGUSTIN score of 4   2/8: Denies CP or SOB. No similar symptoms as yesterday. LHC planned today.Troponin remain flat.   Echo  - The left ventricle is normal in size with low normal systolic function.  - The estimated ejection fraction is 50%.  - Mild right ventricular enlargement.  - Mild right atrial enlargement.  - Mild tricuspid regurgitation.  - Mild pulmonic regurgitation.  - Moderate left atrial enlargement.  - Left ventricular diastolic dysfunction.    02/08 1911-ok to discharge from a cardiology standpoint, no intervention during LHC, will follow cardiology recs-see above.

## 2022-02-08 NOTE — NURSING
rec'd pt to room 657 in , no complaints at present, pt A&O xs4, all pt belongings in room with pt vitals stable, NADN, safety measures ongoing

## 2022-02-08 NOTE — HPI
Mr. Antoni Rayo is a 67y/o M with CAD s/p 4-vessel CABG (appx. 2011), former smoker, gout and HTN presenting with a CC of bilateral arm tingling. He describes being diaphoretic and naseous. These symptoms lasted at least 30 minutes yesterday afternoon and were similar to what he experienced prior to his CABG previously. He denies any inciting event for his upper extremity tingling but is an avid golfer. He denies any chest pain/pressure, SOB, abdominal pain, claudication, LE swelling or prior syncopal episodes. His last echo/stress test/angiogram was in May 2013 per record review per Dr. Sahu ([place]) which did not yield any abnormal findings. He reports he quit smoking around the time of his CABG in 2011 and drinks approximately 3-4 drinks every night (wine, vodka, scotch). He has not been taking a statin because of myalgias. The only medication he takes includes a baby aspirin occasionally and colchicine PRN.     In the ED his VS were stable with a /73, HR 59 with O2 sats at 95% on air. Initial EKG revealed sinus rhythm HR 57. Troponins have been WNL, initially at 6.1 and peaked at 6.6. TSH is WNL. Pt is currently not complaning of any chest pain/pressure or upper extremity numbness/tingling. VS remain normal and he is in no acute distress.

## 2022-02-08 NOTE — CONSULTS
[address]  Cardiology  Consult Note    Patient Name: Antoni Rayo  MRN: [id number]  Admission Date: 2/7/2022  Hospital Length of Stay: 0 days  Code Status: Full Code   Attending Provider: Shimon Kasper MD   Consulting Provider: Yanet Castro DO  Primary Care Physician: Primary Doctor No  Principal Problem:CAD (coronary artery disease)    Patient information was obtained from patient, past medical records and ER records.     Consults  Subjective:     Chief Complaint:  Upper extremity tingling     HPI:   Mr. Antoni Rayo is a 65y/o M with CAD s/p 4-vessel CABG (appx. 2011), former smoker, gout and HTN presenting with a CC of bilateral arm tingling. He describes being diaphoretic and naseous. These symptoms lasted at least 30 minutes yesterday afternoon and were similar to what he experienced prior to his CABG previously. He denies any inciting event for his upper extremity tingling but is an avid golfer. He denies any chest pain/pressure, SOB, abdominal pain, claudication, LE swelling or prior syncopal episodes. His last echo/stress test/angiogram was in May 2013 per record review per Dr. Sahu ([place]) which did not yield any abnormal findings. He reports he quit smoking around the time of his CABG in 2011 and drinks approximately 3-4 drinks every night (wine, vodka, scotch). He has not been taking a statin because of myalgias. The only medication he takes includes a baby aspirin occasionally and colchicine PRN.     In the ED his VS were stable with a /73, HR 59 with O2 sats at 95% on air. Initial EKG revealed sinus rhythm HR 57. Troponins have been WNL, initially at 6.1 and peaked at 6.6. TSH is WNL. Pt is currently not complaning of any chest pain/pressure or upper extremity numbness/tingling. VS remain normal and he is in no acute distress.       Past Medical History:   Diagnosis Date    Hyperlipidemia     Hypertension        Past Surgical History:   Procedure Laterality  Date    ACHILLES TENDON SURGERY      CORONARY ARTERY BYPASS GRAFT      TONSILLECTOMY         Review of patient's allergies indicates:  No Known Allergies    No current facility-administered medications on file prior to encounter.     Current Outpatient Medications on File Prior to Encounter   Medication Sig    aspirin 81 MG Chew Take 81 mg by mouth once daily.    clopidogrel (PLAVIX) 75 mg tablet Take 1 tablet (75 mg total) by mouth once daily.    colchicine (COLCRYS) 0.6 mg tablet Take 2 tab at onset of gout pain. If needed, may take 1 tab one hour after initial dose. Do not exceed 3 tabs in 72 hours.    losartan (COZAAR) 50 MG tablet Take 1 tablet (50 mg total) by mouth once daily.    pravastatin (PRAVACHOL) 10 MG tablet Take 10 mg by mouth once daily.     Family History     Problem Relation (Age of Onset)    Heart attack Father        Tobacco Use    Smoking status: Former Smoker    Smokeless tobacco: Never Used   Substance and Sexual Activity    Alcohol use: Yes    Drug use: No    Sexual activity: Yes     Partners: Female     Review of Systems   Constitutional: Positive for diaphoresis. Negative for chills, fever and night sweats.   HENT: Negative for congestion.    Eyes: Negative for visual disturbance.   Cardiovascular: Negative for chest pain, dyspnea on exertion, leg swelling and syncope.   Respiratory: Negative for cough, shortness of breath and wheezing.    Hematologic/Lymphatic: Negative for adenopathy.   Musculoskeletal: Positive for joint pain ( neck pain). Negative for falls.   Gastrointestinal: Positive for diarrhea and nausea. Negative for abdominal pain, constipation, hematemesis, melena and vomiting.   Genitourinary: Negative for dysuria and hematuria.   Neurological: Negative for headaches.   Psychiatric/Behavioral: Negative for altered mental status.     Objective:     Vital Signs (Most Recent):  Temp: 98.3 °F (36.8 °C) (02/08/22 0730)  Pulse: (!) 54 (02/08/22 0730)  Resp: 18  (02/08/22 0730)  BP: (!) 148/80 (02/08/22 0730)  SpO2: 97 % (02/08/22 0730) Vital Signs (24h Range):  Temp:  [97.8 °F (36.6 °C)-98.3 °F (36.8 °C)] 98.3 °F (36.8 °C)  Pulse:  [52-73] 54  Resp:  [12-24] 18  SpO2:  [94 %-99 %] 97 %  BP: (118-153)/(57-81) 148/80     Weight: 99.8 kg (220 lb 0.3 oz)  Body mass index is 29.84 kg/m².    SpO2: 97 %  O2 Device (Oxygen Therapy): room air      Intake/Output Summary (Last 24 hours) at 2/8/2022 1533  Last data filed at 2/8/2022 0700  Gross per 24 hour   Intake 576.25 ml   Output --   Net 576.25 ml       Lines/Drains/Airways     Peripheral Intravenous Line                 Peripheral IV - Single Lumen 02/07/22 1423 18 G Left Antecubital 1 day                Physical Exam  Vitals reviewed.   Constitutional:       General: He is not in acute distress.     Appearance: Normal appearance. He is normal weight. He is not ill-appearing.   HENT:      Head: Normocephalic and atraumatic.      Right Ear: External ear normal.      Left Ear: External ear normal.      Nose: Nose normal. No congestion or rhinorrhea.      Mouth/Throat:      Mouth: Mucous membranes are moist.      Pharynx: Oropharynx is clear.   Eyes:      Extraocular Movements: Extraocular movements intact.      Pupils: Pupils are equal, round, and reactive to light.   Cardiovascular:      Rate and Rhythm: Normal rate and regular rhythm.      Pulses: Normal pulses.      Heart sounds: Normal heart sounds.   Pulmonary:      Effort: Pulmonary effort is normal. No respiratory distress.      Breath sounds: Normal breath sounds. No wheezing.   Abdominal:      General: Abdomen is flat. Bowel sounds are normal. There is no distension.      Palpations: Abdomen is soft.      Tenderness: There is no abdominal tenderness.   Musculoskeletal:         General: Normal range of motion.      Cervical back: Normal range of motion.   Skin:     General: Skin is warm.   Neurological:      General: No focal deficit present.      Mental Status: He is  alert and oriented to person, place, and time.   Psychiatric:         Mood and Affect: Mood normal.         Significant Labs:   BMP:   Recent Labs   Lab 02/07/22  1519 02/08/22  0545   * 103    140   K 4.0 4.6    105   CO2 25 26   BUN 13 13   CREATININE 1.15 1.13   CALCIUM 9.1 9.0   MG 2.0  --    , CMP   Recent Labs   Lab 02/07/22  1519 02/08/22  0545    140   K 4.0 4.6    105   CO2 25 26   * 103   BUN 13 13   CREATININE 1.15 1.13   CALCIUM 9.1 9.0   PROT 7.6 6.8   ALBUMIN 3.9 3.6   BILITOT 1.0 1.2   ALKPHOS 107 100   AST 57* 37   ALT 71* 59   ANIONGAP 14 14   EGFRNONAA 68 69   , CBC   Recent Labs   Lab 02/07/22  1519 02/07/22  1519 02/08/22  0545   WBC 6.00  --  5.58   HGB 16.1  --  15.3   HCT 46.2   < > 44.4   *  --  147*    < > = values in this interval not displayed.   , INR   Recent Labs   Lab 02/07/22  1903   INR 1.00   , Lipid Panel   Recent Labs   Lab 02/08/22  0545   CHOL 182   HDL 53   LDLCALC 107   TRIG 109   CHOLHDL 3.4   , Troponin No results for input(s): TROPONINI in the last 48 hours. and All pertinent lab results from the last 24 hours have been reviewed.    Significant Imaging: Echocardiogram:   Transthoracic echo (TTE) complete (Cupid Only):   Results for orders placed or performed during the hospital encounter of 02/07/22   Echo   Result Value Ref Range    BSA 2.25 m2    IVC diameter 1.7 cm    EF 50 %    LVIDd 5.00 3.5 - 6.0 cm    IVS 1.10 0.6 - 1.1 cm    Posterior Wall 1.10 0.6 - 1.1 cm    LVIDs 3.90 2.1 - 4.0 cm    FS 22 28 - 44 %    LV mass 207.14 g    LA size 5.20 cm    Left Ventricle Relative Wall Thickness 0.44 cm    AV valve area 2.89 cm2    AV index (prosthetic) 0.64     LVOT diameter 2.40 cm    LVOT area 4.5 cm2    LVOT peak VTI 18.51 cm    Ao VTI 28.93 cm    LVOT stroke volume 83.69 cm3    LV Mass Index 93 g/m2    Narrative    · The left ventricle is normal in size with low normal systolic function.  · The estimated ejection fraction is  50%.  · Mild right ventricular enlargement.  · Mild right atrial enlargement.  · Mild tricuspid regurgitation.  · Mild pulmonic regurgitation.  · Moderate left atrial enlargement.  · Left ventricular diastolic dysfunction.        Assessment and Plan:     * CAD (coronary artery disease)  Heart Score 4; history of 4-vessel CABG in 2011, former smoker, positive family history  EKG reveals sinus rhythm without ST changes, Troponins WNL x4  ECHO reveals EF 50% without abnormal wall motion or hypokinesis to note  Will proceed with [facility] today  Pt has reported intolerances to statin and was unwilling to take it outpatient; Changed statin to zetia 10mg qd  Also had discussion about considering repatha outpatient; Agree with ASA        Hyperlipidemia  Switched statin to zetia due to patient's intolerance to statins previously  Also discussed repatha as an outpatient consideration  Pt agreeable to plan      VTE Risk Mitigation (From admission, onward)         Ordered     Place sequential compression device  Until discontinued         02/07/22 1814     IP VTE LOW RISK PATIENT  Once         02/07/22 1814                Thank you for your consult. I will follow-up with patient. Please contact us if you have any additional questions.    Yanet Castro DO (Resident)  Cardiology   [facility] - [address] [place] [address]

## 2022-02-08 NOTE — ASSESSMENT & PLAN NOTE
Switched statin to zetia due to patient's intolerance to statins previously  Also discussed repatha as an outpatient consideration  Pt agreeable to plan

## 2022-02-08 NOTE — ASSESSMENT & PLAN NOTE
2/7: Pt reports noncompliance with statin medications due to muscle pain while taking medications. Lipitor 80 mg qhs. Lipid panel pending  2/8:    Lab Results   Component Value Date    CHOL 182 02/08/2022     Lab Results   Component Value Date    HDL 53 02/08/2022     Lab Results   Component Value Date    LDLCALC 107 02/08/2022     Lab Results   Component Value Date    TRIG 109 02/08/2022     Lab Results   Component Value Date    CHOLHDL 3.4 02/08/2022

## 2022-02-08 NOTE — ASSESSMENT & PLAN NOTE
2/7: Pt reports noncompliance with statin medications due to muscle pain while taking medications. Lipitor 80 mg qhs. Lipid panel pending

## 2022-02-08 NOTE — HPI
67 y/o male with a past medical history of CAD ( S/P CABG 2011) HLD, admitted to from [facility] ED complaining of a sudden onset of diaphoresis and nausea. The pt reports this morning after waking up he felt soreness in his neck and took Tylenol for his discomfort. He assumed his soreness was from sleeping wrong last night. This afternoon he ate lunch he went to the restroom and had one episode of diarrhea with nausea and diaphoresis. He also reports tachypnea and numbness in his arms. He states his symptoms were similar to what he felt before he had his CABG. He denies SOB, chest pain, abdominal pain, headache, vision disturbance, dizziness or recent illness. Upon exam the pt denies SOB, CP or nausea. He states he has had no other episodes similar to earlier. His wife, who is a physician at [facility], is at bedside and reports his symptoms are similar to when he was found to have CAD requiring CABG. She reports he had a negative LHC about one year after his CABG. He was followed by Cardiologist  in [place] and Dr. Kasper at [place] however, he has not had a established heart doctor since Dr. Kasper's death. The pt reports alcohol use daily. Denies tobacco use. He is vaccinated for Covid.

## 2022-02-08 NOTE — H&P
[facility], [place] Emergency Department  Hospital Medicine  History & Physical    Patient Name: Antoni Rayo  MRN: [id number]  Patient Class: OP- Observation  Admission Date: 2/7/2022  Attending Physician: Shimon Kasper MD   Primary Care Provider: Primary Doctor No         Patient information was obtained from patient and ER records.     Subjective:     Principal Problem:<principal problem not specified>    Chief Complaint:   Chief Complaint   Patient presents with    Shortness of Breath    Nausea        HPI: 65 y/o male with a past medical history of CAD ( S/P CABG 2011) HLD, admitted to from [facility] ED complaining of a sudden onset of diaphoresis and nausea. The pt reports this morning after waking up he felt soreness in his neck and took Tylenol for his discomfort. He assumed his soreness was from sleeping wrong last night. This afternoon he ate lunch he went to the restroom and had one episode of diarrhea with nausea and diaphoresis. He also reports tachypnea and numbness in his arms. He states his symptoms were similar to what he felt before he had his CABG. He denies SOB, chest pain, abdominal pain, headache, vision disturbance, dizziness or recent illness. Upon exam the pt denies SOB, CP or nausea. He states he has had no other episodes similar to earlier. His wife, who is a physician at [facility], is at bedside and reports his symptoms are similar to when he was found to have CAD requiring CABG. She reports he had a negative LHC about one year after his CABG. He was followed by Cardiologist  in [place] and Dr. Kasper at [place] however, he has not had a established heart doctor since Dr. Kasper's death. The pt reports alcohol use daily. Denies tobacco use. He is vaccinated for Covid.                   Past Medical History:   Diagnosis Date    Hyperlipidemia     Hypertension        Past Surgical History:   Procedure Laterality Date    ACHILLES TENDON SURGERY      CORONARY ARTERY BYPASS GRAFT       TONSILLECTOMY         Review of patient's allergies indicates:  No Known Allergies    No current facility-administered medications on file prior to encounter.     Current Outpatient Medications on File Prior to Encounter   Medication Sig    aspirin 81 MG Chew Take 81 mg by mouth once daily.    clopidogrel (PLAVIX) 75 mg tablet Take 1 tablet (75 mg total) by mouth once daily.    colchicine (COLCRYS) 0.6 mg tablet Take 2 tab at onset of gout pain. If needed, may take 1 tab one hour after initial dose. Do not exceed 3 tabs in 72 hours.    losartan (COZAAR) 50 MG tablet Take 1 tablet (50 mg total) by mouth once daily.    pravastatin (PRAVACHOL) 10 MG tablet Take 10 mg by mouth once daily.     Family History     Problem Relation (Age of Onset)    Heart attack Father        Tobacco Use    Smoking status: Former Smoker    Smokeless tobacco: Never Used   Substance and Sexual Activity    Alcohol use: Yes    Drug use: No    Sexual activity: Yes     Partners: Female     Review of Systems   Constitutional: Positive for activity change, chills and diaphoresis. Negative for fever and unexpected weight change.   HENT: Negative for congestion.    Eyes: Negative for visual disturbance.   Respiratory: Negative for chest tightness and shortness of breath.    Cardiovascular: Negative for chest pain, palpitations and leg swelling.   Gastrointestinal: Positive for diarrhea and nausea. Negative for abdominal pain, blood in stool and vomiting.   Neurological: Positive for numbness. Negative for dizziness, syncope, facial asymmetry, speech difficulty, light-headedness and headaches.   All other systems reviewed and are negative.    Objective:     Vital Signs (Most Recent):  Temp: 98.7 °F (37.1 °C) (02/07/22 1454)  Pulse: 60 (02/07/22 1754)  Resp: (!) 22 (02/07/22 1754)  BP: 123/78 (02/07/22 1754)  SpO2: 96 % (02/07/22 1754) Vital Signs (24h Range):  Temp:  [98.7 °F (37.1 °C)] 98.7 °F (37.1 °C)  Pulse:  [55-73] 60  Resp:  [12-24]  22  SpO2:  [94 %-97 %] 96 %  BP: (118-153)/(57-81) 123/78     Weight: 99.8 kg (220 lb)  Body mass index is 29.84 kg/m².    Physical Exam  Vitals reviewed.   Constitutional:       General: He is not in acute distress.     Appearance: Normal appearance. He is obese.   HENT:      Head: Normocephalic and atraumatic.      Nose: Nose normal.      Mouth/Throat:      Mouth: Mucous membranes are moist.   Eyes:      Extraocular Movements: Extraocular movements intact.      Pupils: Pupils are equal, round, and reactive to light.   Cardiovascular:      Rate and Rhythm: Normal rate and regular rhythm.      Pulses: Normal pulses.      Heart sounds: Normal heart sounds. No murmur heard.      Pulmonary:      Effort: Pulmonary effort is normal. No respiratory distress.      Breath sounds: Normal breath sounds.   Abdominal:      General: Bowel sounds are normal. There is no distension.      Palpations: Abdomen is soft.      Tenderness: There is no abdominal tenderness.   Musculoskeletal:         General: No swelling or signs of injury. Normal range of motion.      Cervical back: Normal range of motion and neck supple.   Skin:     General: Skin is warm and dry.      Capillary Refill: Capillary refill takes less than 2 seconds.   Neurological:      General: No focal deficit present.      Mental Status: He is alert and oriented to person, place, and time.      GCS: GCS eye subscore is 4. GCS verbal subscore is 5. GCS motor subscore is 6.      Cranial Nerves: Cranial nerves are intact. No cranial nerve deficit.      Sensory: Sensation is intact. No sensory deficit.      Motor: Motor function is intact. No weakness.      Coordination: Coordination is intact.   Psychiatric:         Mood and Affect: Mood normal.         Behavior: Behavior normal.           CRANIAL NERVES     CN III, IV, VI   Pupils are equal, round, and reactive to light.       Significant Labs: All pertinent labs within the past 24 hours have been reviewed.    Significant  Imaging: I have reviewed all pertinent imaging results/findings within the past 24 hours.    Assessment/Plan:     Hyperlipidemia  2/7: Pt reports noncompliance with statin medications due to muscle pain while taking medications. Lipitor 80 mg qhs. Lipid panel pending      CAD (coronary artery disease)  67 y/o male with a past medical history of CAD ( S/P CABG 2011) HLD, admitted to from [facility] ED complaining of a sudden onset of diaphoresis and nausea.  His symptoms are similar to those he experienced when he was found to have CAD requiring Cabg. Cardiology consulted and plans C in a.m. Troponin negative in ED 6.1. Repeat troponin. Echo pending. Pt reports no BB currently because he can't tolerate them. HR currently 55 in ed.  mg in ed.  * Telemetry Monitoring    * Routine vitals    * Cardiology consulted:    * NPO after midnight   * CBC, CMP, Troponin x3, Lipids, Pt/Inr, Ptt   * Cxr: There is no acute osseous abnormality  * 02 prn maintain sPO2 >92%    * Dvt ppx ; Lovenox 40 mg once  * NTG  Prn for chest pain  *  mg in ED  * Atorvastatin 80 mg qhs   * Hx of CAD: s/p CABG about 10 years ago [facility]  * Echo pending   * Lipid panel ordered   * EKG :    # AUGUSTIN score of 4       VTE Risk Mitigation (From admission, onward)         Ordered     enoxaparin injection 40 mg  Once         02/07/22 1814     Place sequential compression device  Until discontinued         02/07/22 1814     IP VTE LOW RISK PATIENT  Once         02/07/22 1814                   HILDA Mccormick  Department of Hospital Medicine   [facility] - Emergency Department

## 2022-02-08 NOTE — ASSESSMENT & PLAN NOTE
65 y/o male with a past medical history of CAD ( S/P CABG 2011) HLD, admitted to from [facility] ED complaining of a sudden onset of diaphoresis and nausea.  His symptoms are similar to those he experienced when he was found to have CAD requiring Cabg. Cardiology consulted and plans LHC in a.m. Troponin negative in ED 6.1. Repeat troponin. Echo pending. Pt reports no BB currently because he can't tolerate them. HR currently 55 in ed.  mg in ed.  * Telemetry Monitoring    * Routine vitals    * Cardiology consulted:    * NPO after midnight   * CBC, CMP, Troponin x3, Lipids, Pt/Inr, Ptt   * Cxr: There is no acute osseous abnormality  * 02 prn maintain sPO2 >92%    * Dvt ppx ; Lovenox 40 mg once  * NTG  Prn for chest pain  *  mg in ED  * Atorvastatin 80 mg qhs   * Hx of CAD: s/p CABG about 10 years ago [facility]  * Echo pending   * Lipid panel ordered   * EKG :  # AUGUSTIN score of 4   2/8: Denies CP or SOB. No similar symptoms as yesterday. LHC planned today.Troponin remain flat. Echo results pending

## 2022-02-08 NOTE — SUBJECTIVE & OBJECTIVE
Past Medical History:   Diagnosis Date    Hyperlipidemia     Hypertension        Past Surgical History:   Procedure Laterality Date    ACHILLES TENDON SURGERY      CORONARY ARTERY BYPASS GRAFT      TONSILLECTOMY         Review of patient's allergies indicates:  No Known Allergies    No current facility-administered medications on file prior to encounter.     Current Outpatient Medications on File Prior to Encounter   Medication Sig    aspirin 81 MG Chew Take 81 mg by mouth once daily.    clopidogrel (PLAVIX) 75 mg tablet Take 1 tablet (75 mg total) by mouth once daily.    colchicine (COLCRYS) 0.6 mg tablet Take 2 tab at onset of gout pain. If needed, may take 1 tab one hour after initial dose. Do not exceed 3 tabs in 72 hours.    losartan (COZAAR) 50 MG tablet Take 1 tablet (50 mg total) by mouth once daily.    pravastatin (PRAVACHOL) 10 MG tablet Take 10 mg by mouth once daily.     Family History     Problem Relation (Age of Onset)    Heart attack Father        Tobacco Use    Smoking status: Former Smoker    Smokeless tobacco: Never Used   Substance and Sexual Activity    Alcohol use: Yes    Drug use: No    Sexual activity: Yes     Partners: Female     Review of Systems   Constitutional: Positive for activity change, chills and diaphoresis. Negative for fever and unexpected weight change.   HENT: Negative for congestion.    Eyes: Negative for visual disturbance.   Respiratory: Negative for chest tightness and shortness of breath.    Cardiovascular: Negative for chest pain, palpitations and leg swelling.   Gastrointestinal: Positive for diarrhea and nausea. Negative for abdominal pain, blood in stool and vomiting.   Neurological: Positive for numbness. Negative for dizziness, syncope, facial asymmetry, speech difficulty, light-headedness and headaches.   All other systems reviewed and are negative.    Objective:     Vital Signs (Most Recent):  Temp: 98.7 °F (37.1 °C) (02/07/22 1454)  Pulse: 60  (02/07/22 1754)  Resp: (!) 22 (02/07/22 1754)  BP: 123/78 (02/07/22 1754)  SpO2: 96 % (02/07/22 1754) Vital Signs (24h Range):  Temp:  [98.7 °F (37.1 °C)] 98.7 °F (37.1 °C)  Pulse:  [55-73] 60  Resp:  [12-24] 22  SpO2:  [94 %-97 %] 96 %  BP: (118-153)/(57-81) 123/78     Weight: 99.8 kg (220 lb)  Body mass index is 29.84 kg/m².    Physical Exam  Vitals reviewed.   Constitutional:       General: He is not in acute distress.     Appearance: Normal appearance. He is obese.   HENT:      Head: Normocephalic and atraumatic.      Nose: Nose normal.      Mouth/Throat:      Mouth: Mucous membranes are moist.   Eyes:      Extraocular Movements: Extraocular movements intact.      Pupils: Pupils are equal, round, and reactive to light.   Cardiovascular:      Rate and Rhythm: Normal rate and regular rhythm.      Pulses: Normal pulses.      Heart sounds: Normal heart sounds. No murmur heard.      Pulmonary:      Effort: Pulmonary effort is normal. No respiratory distress.      Breath sounds: Normal breath sounds.   Abdominal:      General: Bowel sounds are normal. There is no distension.      Palpations: Abdomen is soft.      Tenderness: There is no abdominal tenderness.   Musculoskeletal:         General: No swelling or signs of injury. Normal range of motion.      Cervical back: Normal range of motion and neck supple.   Skin:     General: Skin is warm and dry.      Capillary Refill: Capillary refill takes less than 2 seconds.   Neurological:      General: No focal deficit present.      Mental Status: He is alert and oriented to person, place, and time.      GCS: GCS eye subscore is 4. GCS verbal subscore is 5. GCS motor subscore is 6.      Cranial Nerves: Cranial nerves are intact. No cranial nerve deficit.      Sensory: Sensation is intact. No sensory deficit.      Motor: Motor function is intact. No weakness.      Coordination: Coordination is intact.   Psychiatric:         Mood and Affect: Mood normal.         Behavior:  Behavior normal.           CRANIAL NERVES     CN III, IV, VI   Pupils are equal, round, and reactive to light.       Significant Labs: All pertinent labs within the past 24 hours have been reviewed.    Significant Imaging: I have reviewed all pertinent imaging results/findings within the past 24 hours.

## 2022-02-08 NOTE — SUBJECTIVE & OBJECTIVE
Past Medical History:   Diagnosis Date    Hyperlipidemia     Hypertension        Past Surgical History:   Procedure Laterality Date    ACHILLES TENDON SURGERY      CORONARY ARTERY BYPASS GRAFT      TONSILLECTOMY         Review of patient's allergies indicates:  No Known Allergies    No current facility-administered medications on file prior to encounter.     Current Outpatient Medications on File Prior to Encounter   Medication Sig    aspirin 81 MG Chew Take 81 mg by mouth once daily.    clopidogrel (PLAVIX) 75 mg tablet Take 1 tablet (75 mg total) by mouth once daily.    colchicine (COLCRYS) 0.6 mg tablet Take 2 tab at onset of gout pain. If needed, may take 1 tab one hour after initial dose. Do not exceed 3 tabs in 72 hours.    losartan (COZAAR) 50 MG tablet Take 1 tablet (50 mg total) by mouth once daily.    pravastatin (PRAVACHOL) 10 MG tablet Take 10 mg by mouth once daily.     Family History     Problem Relation (Age of Onset)    Heart attack Father        Tobacco Use    Smoking status: Former Smoker    Smokeless tobacco: Never Used   Substance and Sexual Activity    Alcohol use: Yes    Drug use: No    Sexual activity: Yes     Partners: Female     Review of Systems   Constitutional: Positive for diaphoresis. Negative for chills, fever and night sweats.   HENT: Negative for congestion.    Eyes: Negative for visual disturbance.   Cardiovascular: Negative for chest pain, dyspnea on exertion, leg swelling and syncope.   Respiratory: Negative for cough, shortness of breath and wheezing.    Hematologic/Lymphatic: Negative for adenopathy.   Musculoskeletal: Positive for joint pain ( neck pain). Negative for falls.   Gastrointestinal: Positive for diarrhea and nausea. Negative for abdominal pain, constipation, hematemesis, melena and vomiting.   Genitourinary: Negative for dysuria and hematuria.   Neurological: Negative for headaches.   Psychiatric/Behavioral: Negative for altered mental status.      Objective:     Vital Signs (Most Recent):  Temp: 98.3 °F (36.8 °C) (02/08/22 0730)  Pulse: (!) 54 (02/08/22 0730)  Resp: 18 (02/08/22 0730)  BP: (!) 148/80 (02/08/22 0730)  SpO2: 97 % (02/08/22 0730) Vital Signs (24h Range):  Temp:  [97.8 °F (36.6 °C)-98.3 °F (36.8 °C)] 98.3 °F (36.8 °C)  Pulse:  [52-73] 54  Resp:  [12-24] 18  SpO2:  [94 %-99 %] 97 %  BP: (118-153)/(57-81) 148/80     Weight: 99.8 kg (220 lb 0.3 oz)  Body mass index is 29.84 kg/m².    SpO2: 97 %  O2 Device (Oxygen Therapy): room air      Intake/Output Summary (Last 24 hours) at 2/8/2022 1533  Last data filed at 2/8/2022 0700  Gross per 24 hour   Intake 576.25 ml   Output --   Net 576.25 ml       Lines/Drains/Airways     Peripheral Intravenous Line                 Peripheral IV - Single Lumen 02/07/22 1423 18 G Left Antecubital 1 day                Physical Exam  Vitals reviewed.   Constitutional:       General: He is not in acute distress.     Appearance: Normal appearance. He is normal weight. He is not ill-appearing.   HENT:      Head: Normocephalic and atraumatic.      Right Ear: External ear normal.      Left Ear: External ear normal.      Nose: Nose normal. No congestion or rhinorrhea.      Mouth/Throat:      Mouth: Mucous membranes are moist.      Pharynx: Oropharynx is clear.   Eyes:      Extraocular Movements: Extraocular movements intact.      Pupils: Pupils are equal, round, and reactive to light.   Cardiovascular:      Rate and Rhythm: Normal rate and regular rhythm.      Pulses: Normal pulses.      Heart sounds: Normal heart sounds.   Pulmonary:      Effort: Pulmonary effort is normal. No respiratory distress.      Breath sounds: Normal breath sounds. No wheezing.   Abdominal:      General: Abdomen is flat. Bowel sounds are normal. There is no distension.      Palpations: Abdomen is soft.      Tenderness: There is no abdominal tenderness.   Musculoskeletal:         General: Normal range of motion.      Cervical back: Normal range of  motion.   Skin:     General: Skin is warm.   Neurological:      General: No focal deficit present.      Mental Status: He is alert and oriented to person, place, and time.   Psychiatric:         Mood and Affect: Mood normal.         Significant Labs:   BMP:   Recent Labs   Lab 02/07/22  1519 02/08/22  0545   * 103    140   K 4.0 4.6    105   CO2 25 26   BUN 13 13   CREATININE 1.15 1.13   CALCIUM 9.1 9.0   MG 2.0  --    , CMP   Recent Labs   Lab 02/07/22  1519 02/08/22  0545    140   K 4.0 4.6    105   CO2 25 26   * 103   BUN 13 13   CREATININE 1.15 1.13   CALCIUM 9.1 9.0   PROT 7.6 6.8   ALBUMIN 3.9 3.6   BILITOT 1.0 1.2   ALKPHOS 107 100   AST 57* 37   ALT 71* 59   ANIONGAP 14 14   EGFRNONAA 68 69   , CBC   Recent Labs   Lab 02/07/22  1519 02/07/22  1519 02/08/22  0545   WBC 6.00  --  5.58   HGB 16.1  --  15.3   HCT 46.2   < > 44.4   *  --  147*    < > = values in this interval not displayed.   , INR   Recent Labs   Lab 02/07/22  1903   INR 1.00   , Lipid Panel   Recent Labs   Lab 02/08/22  0545   CHOL 182   HDL 53   LDLCALC 107   TRIG 109   CHOLHDL 3.4   , Troponin No results for input(s): TROPONINI in the last 48 hours. and All pertinent lab results from the last 24 hours have been reviewed.    Significant Imaging: Echocardiogram:   Transthoracic echo (TTE) complete (Cupid Only):   Results for orders placed or performed during the hospital encounter of 02/07/22   Echo   Result Value Ref Range    BSA 2.25 m2    IVC diameter 1.7 cm    EF 50 %    LVIDd 5.00 3.5 - 6.0 cm    IVS 1.10 0.6 - 1.1 cm    Posterior Wall 1.10 0.6 - 1.1 cm    LVIDs 3.90 2.1 - 4.0 cm    FS 22 28 - 44 %    LV mass 207.14 g    LA size 5.20 cm    Left Ventricle Relative Wall Thickness 0.44 cm    AV valve area 2.89 cm2    AV index (prosthetic) 0.64     LVOT diameter 2.40 cm    LVOT area 4.5 cm2    LVOT peak VTI 18.51 cm    Ao VTI 28.93 cm    LVOT stroke volume 83.69 cm3    LV Mass Index 93 g/m2     Narrative    · The left ventricle is normal in size with low normal systolic function.  · The estimated ejection fraction is 50%.  · Mild right ventricular enlargement.  · Mild right atrial enlargement.  · Mild tricuspid regurgitation.  · Mild pulmonic regurgitation.  · Moderate left atrial enlargement.  · Left ventricular diastolic dysfunction.

## 2022-02-08 NOTE — HOSPITAL COURSE
2/7: 67 y/o male with a past medical history of CAD ( S/P CABG 2011) HLD, admitted to from [facility] complaining of a sudden onset of diaphoresis and nausea. The pt reports this morning after waking up he felt soreness in his neck and took Tylenol for his discomfort. He assumed his soreness was from sleeping wrong last night. This afternoon he ate lunch he went to the restroom and had one episode of diarrhea with nausea and diaphoresis.  2/8 :Denies CP or SOB. No similar symptoms as yesterday. [facility] planned today.Troponin remain flat.     02/08 update:  Ok to discharge from a cardiology standpoint after post cath rehab is complete.  [facility] today: Summary       The left ventricular end diastolic pressure was normal.  The pre-procedure left ventricular end diastolic pressure was 12.  The Ost LAD to Mid LAD lesion was 70% stenosed.  The Prox Cx to Mid Cx lesion was 80% stenosed.  The Mid RCA to Dist RCA lesion was 60% stenosed.  The RPAV lesion was 60% stenosed.  The estimated blood loss was none.  There was three vessel coronary artery disease.     1. Three vessel Coronary artery disease (pLAD 70%, pLCx 80% and 60% RCA)  2. Patent LIMA to LAD  3. Patent SVG to OM2 and SVG to D1  4. Unable to engage SVG to PDA however there is robust competitive flow via native artery     Recommendations:Recommendations     Routine post-cath care.   Maximize medical management.   Continue medical management.   ASA 81mg.   Tobacco cessation counseling.   Statin therapy.       Pt refused statin therapy will discharge on Zetia.  Follow up with BILLIE Summers in one week to discuss Repatha.  Follow up with pcp in one week.  Discussed diet modification.

## 2022-02-08 NOTE — ASSESSMENT & PLAN NOTE
Heart Score 4; history of 4-vessel CABG in 2011, former smoker, positive family history  EKG reveals sinus rhythm without ST changes, Troponins WNL x4  ECHO reveals EF 50% without abnormal wall motion or hypokinesis to note  Will proceed with C today  Pt has reported intolerances to statin and was unwilling to take it outpatient; Changed statin to zetia 10mg qd  Also had discussion about considering repatha outpatient; Agree with ASA

## 2022-02-09 NOTE — ASSESSMENT & PLAN NOTE
2/7: Pt reports noncompliance with statin medications due to muscle pain while taking medications. Lipitor 80 mg qhs. Lipid panel pending  2/8:    Lab Results   Component Value Date    CHOL 182 02/08/2022     Lab Results   Component Value Date    HDL 53 02/08/2022     Lab Results   Component Value Date    LDLCALC 107 02/08/2022     Lab Results   Component Value Date    TRIG 109 02/08/2022     Lab Results   Component Value Date    CHOLHDL 3.4 02/08/2022 02/08 1711 update-Discharge on Zetia daily, follow up with BILLIE Summers in one week to discuss Repatha

## 2022-02-09 NOTE — DISCHARGE SUMMARY
[address] Medicine  Discharge Summary      Patient Name: Antoni Rayo  MRN: [id number]  Patient Class: OP- Observation  Admission Date: 2/7/2022  Hospital Length of Stay: 0 days  Discharge Date and Time:  02/08/2022 7:15 PM  Attending Physician: Shimon Kasper MD   Discharging Provider: ANNE MARIE Castillo  Primary Care Provider: Primary Doctor No      HPI:   65 y/o male with a past medical history of CAD ( S/P CABG 2011) HLD, admitted to from [facility] ED complaining of a sudden onset of diaphoresis and nausea. The pt reports this morning after waking up he felt soreness in his neck and took Tylenol for his discomfort. He assumed his soreness was from sleeping wrong last night. This afternoon he ate lunch he went to the restroom and had one episode of diarrhea with nausea and diaphoresis. He also reports tachypnea and numbness in his arms. He states his symptoms were similar to what he felt before he had his CABG. He denies SOB, chest pain, abdominal pain, headache, vision disturbance, dizziness or recent illness. Upon exam the pt denies SOB, CP or nausea. He states he has had no other episodes similar to earlier. His wife, who is a physician at [facility], is at bedside and reports his symptoms are similar to when he was found to have CAD requiring CABG. She reports he had a negative LHC about one year after his CABG. He was followed by Cardiologist  in [place] and Dr. Kasper at [place] however, he has not had a established heart doctor since Dr. Ksaper's death. The pt reports alcohol use daily. Denies tobacco use. He is vaccinated for Covid.                   Procedure(s) (LRB):  Left heart cath (N/A)      Hospital Course:   2/7: 65 y/o male with a past medical history of CAD ( S/P CABG 2011) HLD, admitted to from [facility] ED complaining of a sudden onset of diaphoresis and nausea. The pt reports this morning after waking up he felt soreness in his neck and took Tylenol  for his discomfort. He assumed his soreness was from sleeping wrong last night. This afternoon he ate lunch he went to the restroom and had one episode of diarrhea with nausea and diaphoresis.  2/8 :Denies CP or SOB. No similar symptoms as yesterday. [facility] planned today.Troponin remain flat.     02/08 update:  Ok to discharge from a cardiology standpoint after post cath rehab is complete.  Cath site to right groin with angioseal, dressing C/D/I, no bleeding noted, pulses palpable to RLE.    [facility] today: Summary       · The left ventricular end diastolic pressure was normal.  · The pre-procedure left ventricular end diastolic pressure was 12.  · The Ost LAD to Mid LAD lesion was 70% stenosed.  · The Prox Cx to Mid Cx lesion was 80% stenosed.  · The Mid RCA to Dist RCA lesion was 60% stenosed.  · The RPAV lesion was 60% stenosed.  · The estimated blood loss was none.  · There was three vessel coronary artery disease.     1. Three vessel Coronary artery disease (pLAD 70%, pLCx 80% and 60% RCA)  2. Patent LIMA to LAD  3. Patent SVG to OM2 and SVG to D1  4. Unable to engage SVG to PDA however there is robust competitive flow via native artery     Recommendations:Recommendations     Routine post-cath care.   Maximize medical management.   Continue medical management.   ASA 81mg.   Tobacco cessation counseling.   Statin therapy.       Pt refused statin therapy will discharge on Zetia.  Follow up with BILLIE Summers in one week to discuss Repatha.  Follow up with pcp in one week.  Discussed diet modification.           Goals of Care Treatment Preferences:  Code Status: Full Code      Consults:   Consults (From admission, onward)        Status Ordering Provider     Inpatient consult to Cardiology  Once        Provider:  Rambo Duvall,     Completed PARKER CULVER          * CAD (coronary artery disease)  67 y/o male with a past medical history of CAD ( S/P CABG 2011) HLD, admitted to from [facility] complaining of a sudden  onset of diaphoresis and nausea.  His symptoms are similar to those he experienced when he was found to have CAD requiring Cabg. Cardiology consulted and plans LHC in a.m. Troponin negative in ED 6.1. Repeat troponin. Echo pending. Pt reports no BB currently because he can't tolerate them. HR currently 55 in ed.  mg in ed.  * Telemetry Monitoring    * Routine vitals    * Cardiology consulted:    * NPO after midnight   * CBC, CMP, Troponin x3, Lipids, Pt/Inr, Ptt   * Cxr: There is no acute osseous abnormality  * 02 prn maintain sPO2 >92%    * Dvt ppx ; Lovenox 40 mg once  * NTG  Prn for chest pain  *  mg in ED  * Atorvastatin 80 mg qhs   * Hx of CAD: s/p CABG about 10 years ago [facility]  * Echo pending   * Lipid panel ordered   * EKG :  # AUGUSTIN score of 4   2/8: Denies CP or SOB. No similar symptoms as yesterday. LHC planned today.Troponin remain flat.   Echo  - The left ventricle is normal in size with low normal systolic function.  - The estimated ejection fraction is 50%.  - Mild right ventricular enlargement.  - Mild right atrial enlargement.  - Mild tricuspid regurgitation.  - Mild pulmonic regurgitation.  - Moderate left atrial enlargement.  - Left ventricular diastolic dysfunction.    02/08 1911-ok to discharge from a cardiology standpoint, no intervention during C, will follow cardiology recs-see above.            CTS (carpal tunnel syndrome)  02/08--Follow up outpatient      Hyperlipidemia  2/7: Pt reports noncompliance with statin medications due to muscle pain while taking medications. Lipitor 80 mg qhs. Lipid panel pending  2/8:    Lab Results   Component Value Date    CHOL 182 02/08/2022     Lab Results   Component Value Date    HDL 53 02/08/2022     Lab Results   Component Value Date    LDLCALC 107 02/08/2022     Lab Results   Component Value Date    TRIG 109 02/08/2022     Lab Results   Component Value Date    CHOLHDL 3.4 02/08/2022 02/08 1711 update-Discharge on Zetia  daily, follow up with BILLIE Summers in one week to discuss Repatha        Final Active Diagnoses:    Diagnosis Date Noted POA    PRINCIPAL PROBLEM:  CAD (coronary artery disease) [I25.10] 04/16/2014 Yes    CTS (carpal tunnel syndrome) [G56.00] 02/08/2022 Yes    Hyperlipidemia [E78.5] 04/16/2014 Yes      Problems Resolved During this Admission:       Discharged Condition: stable    Disposition: Home or Self Care    Follow Up:   Follow-up Information     Lasha Louis In 1 week.    Why: Hospital follow up  Contact information:  Jluis [place]           HILDA Patrick In 1 week.    Specialty: Cardiology  Why: Hospital follow up, s/p heart cath, discuss repatha  Contact information:  [address] [place] [address] [facility]  Jluis [place] [address]  [phone]                       Patient Instructions:      Diet Cardiac     Notify your health care provider if you experience any of the following:  temperature >100.4     Notify your health care provider if you experience any of the following:  persistent nausea and vomiting or diarrhea     Notify your health care provider if you experience any of the following:  severe uncontrolled pain     Notify your health care provider if you experience any of the following:  redness, tenderness, or signs of infection (pain, swelling, redness, odor or green/yellow discharge around incision site)     Notify your health care provider if you experience any of the following:  difficulty breathing or increased cough     Notify your health care provider if you experience any of the following:  severe persistent headache     Notify your health care provider if you experience any of the following:  worsening rash     Notify your health care provider if you experience any of the following:  persistent dizziness, light-headedness, or visual disturbances     Notify your health care provider if you experience any of the following:  increased confusion or weakness     Shower  on day dressing removed (No bath)     Activity as tolerated       Significant Diagnostic Studies: Labs:   BMP:   Recent Labs   Lab 02/07/22  1519 02/08/22  0545   * 103    140   K 4.0 4.6    105   CO2 25 26   BUN 13 13   CREATININE 1.15 1.13   CALCIUM 9.1 9.0   MG 2.0  --    , CBC   Recent Labs   Lab 02/07/22  1519 02/07/22  1519 02/08/22  0545   WBC 6.00  --  5.58   HGB 16.1  --  15.3   HCT 46.2   < > 44.4   *  --  147*    < > = values in this interval not displayed.   , Lipid Panel   Lab Results   Component Value Date    CHOL 182 02/08/2022    HDL 53 02/08/2022    LDLCALC 107 02/08/2022    TRIG 109 02/08/2022    CHOLHDL 3.4 02/08/2022    and Troponin No results for input(s): TROPONINI in the last 168 hours.  Cardiac Graphics: Echocardiogram:   Transthoracic echo (TTE) complete (Cupid Only):   Results for orders placed or performed during the hospital encounter of 02/07/22   Echo   Result Value Ref Range    BSA 2.25 m2    IVC diameter 1.7 cm    EF 50 %    LVIDd 5.00 3.5 - 6.0 cm    IVS 1.10 0.6 - 1.1 cm    Posterior Wall 1.10 0.6 - 1.1 cm    LVIDs 3.90 2.1 - 4.0 cm    FS 22 28 - 44 %    LV mass 207.14 g    LA size 5.20 cm    Left Ventricle Relative Wall Thickness 0.44 cm    AV valve area 2.89 cm2    AV index (prosthetic) 0.64     LVOT diameter 2.40 cm    LVOT area 4.5 cm2    LVOT peak VTI 18.51 cm    Ao VTI 28.93 cm    LVOT stroke volume 83.69 cm3    LV Mass Index 93 g/m2    Narrative    · The left ventricle is normal in size with low normal systolic function.  · The estimated ejection fraction is 50%.  · Mild right ventricular enlargement.  · Mild right atrial enlargement.  · Mild tricuspid regurgitation.  · Mild pulmonic regurgitation.  · Moderate left atrial enlargement.  · Left ventricular diastolic dysfunction.          Pending Diagnostic Studies:     Procedure Component Value Units Date/Time    EKG 12-lead [[id number]] Collected: 02/07/22 1537    Order Status: Sent Lab Status: In  process Updated: 02/08/22 0605    Narrative:      Test Reason : R06.02,    Vent. Rate : 053 BPM     Atrial Rate : 000 BPM     P-R Int : 194 ms          QRS Dur : 112 ms      QT Int : 450 ms       P-R-T Axes : 073 116 090 degrees     QTc Int : 438 ms    Sinus rhythm  Right axis deviation  Small inferior Q waves: infarct cannot be excluded  Lateral T wave abnormality  is nonspecific  Borderline ECG      Referred By: [email or web address]   SELF           Confirmed By:     EKG 12-lead [[id number]] Collected: 02/07/22 1513    Order Status: Sent Lab Status: In process Updated: 02/08/22 0605    Narrative:      Test Reason : R07.9,    Vent. Rate : 057 BPM     Atrial Rate : 000 BPM     P-R Int : 196 ms          QRS Dur : 112 ms      QT Int : 458 ms       P-R-T Axes : 068 120 085 degrees     QTc Int : 453 ms    Sinus rhythm  Marked right axis deviation  Borderline ECG      Referred By: [email or web address]   SELF           Confirmed By:     EKG 12-lead [[id number]] Collected: 02/07/22 1604    Order Status: Sent Lab Status: In process Updated: 02/08/22 0605    Narrative:      Test Reason : R06.02,    Vent. Rate : 062 BPM     Atrial Rate : 000 BPM     P-R Int : 196 ms          QRS Dur : 112 ms      QT Int : 438 ms       P-R-T Axes : 067 114 083 degrees     QTc Int : 442 ms    Sinus rhythm  Right axis deviation  Small inferior Q waves: infarct cannot be excluded  Borderline ECG      Referred By: AAAREFERR   SELF           Confirmed By:     EXTRA TUBES [[id number]] Collected: 02/07/22 1521    Order Status: Sent Lab Status: In process Updated: 02/07/22 1521    Specimen: Blood, Venous     Narrative:      The following orders were created for panel order EXTRA TUBES.  Procedure                               Abnormality         Status                     ---------                               -----------         ------                     Red Top Hold[[id number]]                                     In process                   Please view results for these  tests on the individual orders.         Medications:  Reconciled Home Medications:      Medication List      START taking these medications    ezetimibe 10 mg tablet  Commonly known as: ZETIA  Take 1 tablet (10 mg total) by mouth every evening.        CONTINUE taking these medications    aspirin 81 MG Chew  Take 81 mg by mouth once daily.     colchicine 0.6 mg tablet  Commonly known as: COLCRYS  Take 2 tab at onset of gout pain. If needed, may take 1 tab one hour after initial dose. Do not exceed 3 tabs in 72 hours.        STOP taking these medications    clopidogreL 75 mg tablet  Commonly known as: PLAVIX     losartan 50 MG tablet  Commonly known as: COZAAR     pravastatin 10 MG tablet  Commonly known as: PRAVACHOL            Indwelling Lines/Drains at time of discharge:   Lines/Drains/Airways     None                 Time spent on the discharge of patient: >30 minutes         ANNE MARIE Castillo  Department of Hospital Medicine  [address]

## 2022-02-09 NOTE — NURSING
Pt d/c home per wc, via personal vehicle with all pt belongings and discharge instructions given, voiced understanding,  no complaints voiced, all lines have been dc'd, drsg to right groin from heart cath today dry and intact, no s/s of actve bleeding noted

## 2022-02-16 ENCOUNTER — OFFICE VISIT (OUTPATIENT)
Dept: CARDIOLOGY | Facility: CLINIC | Age: 67
End: 2022-02-16
Payer: COMMERCIAL

## 2022-02-16 VITALS
HEART RATE: 81 BPM | DIASTOLIC BLOOD PRESSURE: 82 MMHG | SYSTOLIC BLOOD PRESSURE: 132 MMHG | WEIGHT: 238 LBS | BODY MASS INDEX: 34.07 KG/M2 | HEIGHT: 70 IN

## 2022-02-16 DIAGNOSIS — I25.810 CORONARY ARTERY DISEASE INVOLVING CORONARY BYPASS GRAFT OF NATIVE HEART WITHOUT ANGINA PECTORIS: ICD-10-CM

## 2022-02-16 DIAGNOSIS — E78.5 HYPERLIPIDEMIA, UNSPECIFIED HYPERLIPIDEMIA TYPE: ICD-10-CM

## 2022-02-16 PROCEDURE — 99213 OFFICE O/P EST LOW 20 MIN: CPT | Mod: PBBFAC | Performed by: STUDENT IN AN ORGANIZED HEALTH CARE EDUCATION/TRAINING PROGRAM

## 2022-02-16 PROCEDURE — 99214 PR OFFICE/OUTPT VISIT, EST, LEVL IV, 30-39 MIN: ICD-10-PCS | Mod: S$PBB,,, | Performed by: STUDENT IN AN ORGANIZED HEALTH CARE EDUCATION/TRAINING PROGRAM

## 2022-02-16 PROCEDURE — 99214 OFFICE O/P EST MOD 30 MIN: CPT | Mod: S$PBB,,, | Performed by: STUDENT IN AN ORGANIZED HEALTH CARE EDUCATION/TRAINING PROGRAM

## 2022-02-16 NOTE — PROGRESS NOTES
PCP: Primary Doctor No    Referring Provider:     Subjective:   Antoni Rayo is a 66 y.o. male with hx of CAD s/p CABG (LIMA to LAD, SVG to OM, SVG to PDA) who presents for post hospital discharge.     Patient and arm tingling that he was concerned about an MI. Was admitted to the hospital and underwent LHC that showd 3VCAD with patent grafts.   Doing well. Play [employer] ball, without any issues.     Fhx: non-contributary  Shx: Denies smoking, etoh or drug use    EKG 2/7/22 - NSR  ECHO 2/8/22  · The left ventricle is normal in size with low normal systolic function.  · The estimated ejection fraction is 50%.  · Mild right ventricular enlargement.  · Mild right atrial enlargement.  · Mild tricuspid regurgitation.  · Mild pulmonic regurgitation.  · Moderate left atrial enlargement.  · Left ventricular diastolic dysfunction.      LHC 2/7/22  1. Three vessel Coronary artery disease (pLAD 70%, pLCx 80% and 60% RCA)  2. Patent LIMA to LAD  3. Patent SVG to OM2 and SVG to D1  4. Unable to engage SVG to PDA however there is robust competitive flow via native artery         Lab Results   Component Value Date     02/08/2022    K 4.6 02/08/2022     02/08/2022    CO2 26 02/08/2022    BUN 13 02/08/2022    CREATININE 1.13 02/08/2022    CALCIUM 9.0 02/08/2022    ANIONGAP 14 02/08/2022    EGFRNONAA 69 02/08/2022       Lab Results   Component Value Date    CHOL 182 02/08/2022     Lab Results   Component Value Date    HDL 53 02/08/2022     Lab Results   Component Value Date    LDLCALC 107 02/08/2022     Lab Results   Component Value Date    TRIG 109 02/08/2022     Lab Results   Component Value Date    CHOLHDL 3.4 02/08/2022       Lab Results   Component Value Date    WBC 5.58 02/08/2022    HGB 15.3 02/08/2022    HCT 44.4 02/08/2022    MCV 92.7 02/08/2022     (L) 02/08/2022           Review of Systems   Respiratory: Negative for cough and shortness of breath.    Cardiovascular: Negative for chest pain, palpitations,  "orthopnea, claudication, leg swelling and PND.         Objective:   /82   Pulse 81   Ht 5' 10" (1.778 m)   Wt 108 kg (238 lb)   BMI 34.15 kg/m²     Physical Exam  Vitals and nursing note reviewed.   Cardiovascular:      Rate and Rhythm: Normal rate and regular rhythm.      Pulses: Normal pulses.      Heart sounds: Normal heart sounds.   Pulmonary:      Breath sounds: Normal breath sounds.   Neurological:      Mental Status: He is oriented to person, place, and time.           Assessment:     1. Coronary artery disease involving coronary bypass graft of native heart without angina pectoris     2. Hyperlipidemia, unspecified hyperlipidemia type           Plan:   CAD (coronary artery disease)  HX of MI s/p CABG   Recent LHC in 1/2022 - 3VCAD with patent LIMA to LAD, SVG to OM, unable to engage SVG to RCA however appears to have retrograde flow in the SVG selene  Statin intolerant (muscle aches and pain to lipitor and crestor); start repatha  Continue zetia; can stop after intiation of repetha  Ciontinue aspirin    Hyperlipidemia  LDL above goal of 70  Statin intolerant  Start repatha given hx of MI and Bypass graft      "

## 2022-02-16 NOTE — ASSESSMENT & PLAN NOTE
HX of MI s/p CABG   Recent LHC in 1/2022 - 3VCAD with patent LIMA to LAD, SVG to OM, unable to engage SVG to RCA however appears to have retrograde flow in the SVG selene  Statin intolerant (muscle aches and pain to lipitor and crestor); start repatha  Continue zetia; can stop after intiation of repetha  Ciontinue aspirin

## 2022-05-16 ENCOUNTER — INFUSION (OUTPATIENT)
Dept: INFECTIOUS DISEASES | Facility: HOSPITAL | Age: 67
End: 2022-05-16
Attending: EMERGENCY MEDICINE
Payer: COMMERCIAL

## 2022-05-16 VITALS
DIASTOLIC BLOOD PRESSURE: 70 MMHG | BODY MASS INDEX: 30.48 KG/M2 | TEMPERATURE: 97 F | RESPIRATION RATE: 18 BRPM | OXYGEN SATURATION: 97 % | WEIGHT: 225 LBS | HEART RATE: 64 BPM | SYSTOLIC BLOOD PRESSURE: 141 MMHG | HEIGHT: 72 IN

## 2022-05-16 DIAGNOSIS — R06.02 SHORTNESS OF BREATH: Primary | ICD-10-CM

## 2022-05-16 DIAGNOSIS — U07.1 COVID: Primary | ICD-10-CM

## 2022-05-16 PROCEDURE — 63600175 PHARM REV CODE 636 W HCPCS: Performed by: NURSE PRACTITIONER

## 2022-05-16 PROCEDURE — M0222 HC IV INJECTION, BEBTELOVIMAB, INCL POST ADMIN MONIT: HCPCS | Performed by: NURSE PRACTITIONER

## 2022-05-16 RX ORDER — ACETAMINOPHEN 325 MG/1
650 TABLET ORAL
Status: ACTIVE | OUTPATIENT
Start: 2022-05-16 | End: 2022-05-17

## 2022-05-16 RX ORDER — ONDANSETRON 4 MG/1
4 TABLET, ORALLY DISINTEGRATING ORAL
Status: CANCELLED | OUTPATIENT
Start: 2022-05-16 | End: 2022-05-17

## 2022-05-16 RX ORDER — DIPHENHYDRAMINE HYDROCHLORIDE 50 MG/ML
25 INJECTION INTRAMUSCULAR; INTRAVENOUS
Status: ACTIVE | OUTPATIENT
Start: 2022-05-16 | End: 2022-05-17

## 2022-05-16 RX ORDER — ACETAMINOPHEN 325 MG/1
650 TABLET ORAL
Status: CANCELLED | OUTPATIENT
Start: 2022-05-16 | End: 2022-05-17

## 2022-05-16 RX ORDER — ALBUTEROL SULFATE 90 UG/1
2 AEROSOL, METERED RESPIRATORY (INHALATION)
Status: CANCELLED | OUTPATIENT
Start: 2022-05-16 | End: 2022-05-19

## 2022-05-16 RX ORDER — ONDANSETRON 4 MG/1
4 TABLET, ORALLY DISINTEGRATING ORAL
Status: ACTIVE | OUTPATIENT
Start: 2022-05-16 | End: 2022-05-17

## 2022-05-16 RX ORDER — DIPHENHYDRAMINE HYDROCHLORIDE 50 MG/ML
25 INJECTION INTRAMUSCULAR; INTRAVENOUS
Status: CANCELLED | OUTPATIENT
Start: 2022-05-16 | End: 2022-05-17

## 2022-05-16 RX ORDER — BEBTELOVIMAB 87.5 MG/ML
175 INJECTION, SOLUTION INTRAVENOUS
Status: COMPLETED | OUTPATIENT
Start: 2022-05-16 | End: 2022-05-16

## 2022-05-16 RX ORDER — EPINEPHRINE 0.3 MG/.3ML
0.3 INJECTION SUBCUTANEOUS
Status: ACTIVE | OUTPATIENT
Start: 2022-05-16 | End: 2022-05-19

## 2022-05-16 RX ORDER — EPINEPHRINE 0.3 MG/.3ML
0.3 INJECTION SUBCUTANEOUS
Status: CANCELLED | OUTPATIENT
Start: 2022-05-16 | End: 2022-05-19

## 2022-05-16 RX ORDER — ALBUTEROL SULFATE 90 UG/1
2 AEROSOL, METERED RESPIRATORY (INHALATION)
Status: ACTIVE | OUTPATIENT
Start: 2022-05-16 | End: 2022-05-19

## 2022-05-16 RX ORDER — BEBTELOVIMAB 87.5 MG/ML
175 INJECTION, SOLUTION INTRAVENOUS
Status: CANCELLED | OUTPATIENT
Start: 2022-05-16 | End: 2022-05-16

## 2022-05-16 RX ORDER — BEBTELOVIMAB 87.5 MG/ML
175 INJECTION, SOLUTION INTRAVENOUS
Status: CANCELLED | OUTPATIENT
Start: 2022-05-16

## 2022-05-16 RX ADMIN — BEBTELOVIMAB 175 MG: 87.5 INJECTION, SOLUTION INTRAVENOUS at 09:05

## 2022-06-16 NOTE — PROGRESS NOTES
Bebtelovimab infusion ordered on 5/16/22 due to patient being tested positive for COVID infection.

## 2022-06-20 ENCOUNTER — OFFICE VISIT (OUTPATIENT)
Dept: OTOLARYNGOLOGY | Facility: CLINIC | Age: 67
End: 2022-06-20
Payer: COMMERCIAL

## 2022-06-20 VITALS — BODY MASS INDEX: 30.48 KG/M2 | WEIGHT: 225 LBS | HEIGHT: 72 IN

## 2022-06-20 DIAGNOSIS — H90.2 CONDUCTIVE HEARING LOSS, UNSPECIFIED LATERALITY: ICD-10-CM

## 2022-06-20 DIAGNOSIS — H61.21 IMPACTED CERUMEN OF RIGHT EAR: Primary | ICD-10-CM

## 2022-06-20 PROCEDURE — 69210 REMOVE IMPACTED EAR WAX UNI: CPT | Mod: PBBFAC | Performed by: OTOLARYNGOLOGY

## 2022-06-20 PROCEDURE — 3008F BODY MASS INDEX DOCD: CPT | Mod: CPTII,,, | Performed by: OTOLARYNGOLOGY

## 2022-06-20 PROCEDURE — 99204 OFFICE O/P NEW MOD 45 MIN: CPT | Mod: S$PBB,25,, | Performed by: OTOLARYNGOLOGY

## 2022-06-20 PROCEDURE — 1160F PR REVIEW ALL MEDS BY PRESCRIBER/CLIN PHARMACIST DOCUMENTED: ICD-10-PCS | Mod: CPTII,,, | Performed by: OTOLARYNGOLOGY

## 2022-06-20 PROCEDURE — 99213 OFFICE O/P EST LOW 20 MIN: CPT | Mod: PBBFAC | Performed by: OTOLARYNGOLOGY

## 2022-06-20 PROCEDURE — 69210 REMOVE IMPACTED EAR WAX UNI: CPT | Mod: S$PBB,,, | Performed by: OTOLARYNGOLOGY

## 2022-06-20 PROCEDURE — 1159F MED LIST DOCD IN RCRD: CPT | Mod: CPTII,,, | Performed by: OTOLARYNGOLOGY

## 2022-06-20 PROCEDURE — 3008F PR BODY MASS INDEX (BMI) DOCUMENTED: ICD-10-PCS | Mod: CPTII,,, | Performed by: OTOLARYNGOLOGY

## 2022-06-20 PROCEDURE — 69210 PR REMOVAL IMPACTED CERUMEN REQUIRING INSTRUMENTATION, UNILATERAL: ICD-10-PCS | Mod: S$PBB,,, | Performed by: OTOLARYNGOLOGY

## 2022-06-20 PROCEDURE — 99204 PR OFFICE/OUTPT VISIT, NEW, LEVL IV, 45-59 MIN: ICD-10-PCS | Mod: S$PBB,25,, | Performed by: OTOLARYNGOLOGY

## 2022-06-20 PROCEDURE — 1160F RVW MEDS BY RX/DR IN RCRD: CPT | Mod: CPTII,,, | Performed by: OTOLARYNGOLOGY

## 2022-06-20 PROCEDURE — 1159F PR MEDICATION LIST DOCUMENTED IN MEDICAL RECORD: ICD-10-PCS | Mod: CPTII,,, | Performed by: OTOLARYNGOLOGY

## 2022-06-20 NOTE — PROGRESS NOTES
Subjective:       Patient ID: Antoni Rayo is a 66 y.o. male.    Chief Complaint: Ear Fullness (Patient is here for right ear fullness.)    HPI  Review of Systems   HENT: Positive for ear pain and hearing loss.    All other systems reviewed and are negative.      Objective:      Physical Exam  General: NAD  Head: Normocephalic, atraumatic, no facial asymmetry/normal strength,  Ears: Both auricules normal in appearance, w/o deformities tympanic membranes normal external auditory canals wax impaction right   Nose: External nose w/o deformities normal turbinates no drainage or inflammation  Oral Cavity: Lips, gums, floor of mouth, tongue hard palate, and buccal mucosa without mass/lesion  Oropharynx: Mucosa pink and moist, soft palate, posterior pharynx and oropharyngeal wall without mass/lesion  Neck: Supple, symmetric, trachea midline, no palpable mass/lesion, no palpable cervical lymphadenopathy  Skin: Warm and dry, no concerning lesions  Respiratory: Respirations even, unlabored      Procedure: Binocular microscopy with removal of cerumen impaction using microsurgical instrumentation.  After explaining the procedure and obtaining verbal assent,  The right  external auditory canal visualized with the 250 mm focal length lens through the operating microscope. The obstructing cerumen was removed with microsurgical instrumentation to reveal normal and healthy external auditory canal. The patient tolerated this procedure well without complication.        Assessment:       1. Impacted cerumen of right ear    2. Conductive hearing loss, unspecified laterality        Plan:       Cleaned under scope

## 2023-05-19 ENCOUNTER — OFFICE VISIT (OUTPATIENT)
Dept: DERMATOLOGY | Facility: CLINIC | Age: 68
End: 2023-05-19
Payer: MEDICARE

## 2023-05-19 DIAGNOSIS — L21.9 SEBORRHEIC DERMATITIS: ICD-10-CM

## 2023-05-19 DIAGNOSIS — D18.01 CHERRY ANGIOMA: ICD-10-CM

## 2023-05-19 DIAGNOSIS — L71.9 ROSACEA: Primary | ICD-10-CM

## 2023-05-19 DIAGNOSIS — L82.1 SEBORRHEIC KERATOSES: ICD-10-CM

## 2023-05-19 PROCEDURE — 99204 OFFICE O/P NEW MOD 45 MIN: CPT | Mod: ,,, | Performed by: STUDENT IN AN ORGANIZED HEALTH CARE EDUCATION/TRAINING PROGRAM

## 2023-05-19 PROCEDURE — 99204 PR OFFICE/OUTPT VISIT, NEW, LEVL IV, 45-59 MIN: ICD-10-PCS | Mod: ,,, | Performed by: STUDENT IN AN ORGANIZED HEALTH CARE EDUCATION/TRAINING PROGRAM

## 2023-05-19 RX ORDER — OXYMETAZOLINE HYDROCHLORIDE 1 G/100G
1 CREAM TOPICAL DAILY PRN
Qty: 30 G | Refills: 11 | Status: SHIPPED | OUTPATIENT
Start: 2023-05-19

## 2023-05-19 RX ORDER — METRONIDAZOLE 7.5 MG/G
GEL TOPICAL 2 TIMES DAILY
Qty: 45 G | Refills: 11 | Status: SHIPPED | OUTPATIENT
Start: 2023-05-19 | End: 2024-05-18

## 2023-05-19 RX ORDER — KETOCONAZOLE 20 MG/ML
SHAMPOO, SUSPENSION TOPICAL
Qty: 120 ML | Refills: 11 | Status: SHIPPED | OUTPATIENT
Start: 2023-05-22

## 2023-05-19 NOTE — PROGRESS NOTES
[facility]  Duane Govea MD    [address] [place] [address]  [phone]    Fax: [phone]    Patient Name: Antoni Rayo  Medical Record Number: [id number]  PCP: Primary Doctor No  Age: 67 y.o. : 1955  Contact: [phone] (home)     CC: rosacea  History of Present Illness:     Antoni Rayo is a 67 y.o.  male with no history of skin cancer  who presents to clinic today for rosacea. Symptoms include erythema. Previous treatments include doxycycline and metrogel.     The patient has no other concerns today.    Review of Systems:     Unremarkable other than mentioned above.     Physical Exam:     General: Relaxed, oriented, alert    Skin examination of the scalp, face, neck, chest, back, abdomen, upper extremities and lower extremities were normal except for as listed below      Assessment and Plan:     1. Rosacea  - Erythematous papules and pustules on cheeks and nose with background erythema     Plan:    Metronidazole gel bid PRN    Rhofade daily PRN     Counseling.  Rosacea can be a/w cardiac disease, and can involve the eyes as well.       2. Seborrheic keratoses   - brown stuck on appearing papules/plaques  - patient educated on benign nature. No treatment necessary unless they become irritated or inflamed       3. Cherry Angiomas  - Scattered bright pink papules    Plan: Counseling  I counseled the patient regarding the diagnosis including that cherry angiomas are benign skin growths requiring no treatment. Patients get more as they age.    4. Seborrheic Dermatitis   - Red scaly plaques located on the scalp, nasolabial folds, and eyebrows    Plan:   - ketoconazole shampoo 2-3 x weekly PRN     Return to clinic in 1 year.     AVS printed with patient instructions     Duane Govea MD   Mohs Surgery/Dermatologic Oncology  Dermatology

## 2023-09-13 NOTE — SUBJECTIVE & OBJECTIVE
Interval History: Pt denies SOB, CP or any symptoms similar to what brought him in.     Review of Systems   Constitutional: Negative for fever and unexpected weight change.   HENT: Negative for congestion.    Eyes: Negative for visual disturbance.   Respiratory: Negative for chest tightness and shortness of breath.    Cardiovascular: Negative for chest pain, palpitations and leg swelling.   Gastrointestinal: Negative for abdominal pain, blood in stool and vomiting.   Neurological: Negative for dizziness, syncope, facial asymmetry, speech difficulty, light-headedness and headaches.   All other systems reviewed and are negative.    Objective:     Vital Signs (Most Recent):  Temp: 98.3 °F (36.8 °C) (02/08/22 0730)  Pulse: (!) 54 (02/08/22 0730)  Resp: 18 (02/08/22 0730)  BP: (!) 148/80 (02/08/22 0730)  SpO2: 97 % (02/08/22 0730) Vital Signs (24h Range):  Temp:  [97.8 °F (36.6 °C)-98.7 °F (37.1 °C)] 98.3 °F (36.8 °C)  Pulse:  [52-73] 54  Resp:  [12-24] 18  SpO2:  [94 %-99 %] 97 %  BP: (118-153)/(57-81) 148/80     Weight: 99.8 kg (220 lb 0.3 oz)  Body mass index is 29.84 kg/m².    Intake/Output Summary (Last 24 hours) at 2/8/2022 0940  Last data filed at 2/8/2022 0700  Gross per 24 hour   Intake 576.25 ml   Output --   Net 576.25 ml      Physical Exam  Vitals and nursing note reviewed.   Constitutional:       General: He is not in acute distress.     Appearance: Normal appearance. He is obese.   HENT:      Head: Normocephalic and atraumatic.      Nose: Nose normal.      Mouth/Throat:      Mouth: Mucous membranes are moist.   Eyes:      Extraocular Movements: Extraocular movements intact.      Pupils: Pupils are equal, round, and reactive to light.   Cardiovascular:      Rate and Rhythm: Normal rate and regular rhythm.      Pulses: Normal pulses.      Heart sounds: Normal heart sounds. No murmur heard.      Pulmonary:      Effort: Pulmonary effort is normal. No respiratory distress.      Breath sounds: Normal breath  The patient is a 72y Male complaining of abscess. sounds.   Abdominal:      General: Bowel sounds are normal. There is no distension.      Palpations: Abdomen is soft.      Tenderness: There is no abdominal tenderness.   Musculoskeletal:         General: No swelling or signs of injury. Normal range of motion.      Cervical back: Normal range of motion and neck supple.   Skin:     General: Skin is warm and dry.      Capillary Refill: Capillary refill takes less than 2 seconds.   Neurological:      General: No focal deficit present.      Mental Status: He is alert and oriented to person, place, and time.      GCS: GCS eye subscore is 4. GCS verbal subscore is 5. GCS motor subscore is 6.      Cranial Nerves: Cranial nerves are intact. No cranial nerve deficit.      Sensory: Sensation is intact. No sensory deficit.      Motor: Motor function is intact. No weakness.      Coordination: Coordination is intact.   Psychiatric:         Mood and Affect: Mood normal.         Behavior: Behavior normal.         Significant Labs: All pertinent labs within the past 24 hours have been reviewed.    Significant Imaging: I have reviewed all pertinent imaging results/findings within the past 24 hours.

## 2025-02-05 ENCOUNTER — HOSPITAL ENCOUNTER (EMERGENCY)
Facility: HOSPITAL | Age: 70
Discharge: HOME | End: 2025-02-05
Attending: EMERGENCY MEDICINE
Payer: MEDICARE

## 2025-02-05 VITALS
HEIGHT: 72 IN | BODY MASS INDEX: 30.81 KG/M2 | OXYGEN SATURATION: 95 % | TEMPERATURE: 98 F | HEART RATE: 80 BPM | WEIGHT: 227.5 LBS | DIASTOLIC BLOOD PRESSURE: 80 MMHG | RESPIRATION RATE: 18 BRPM | SYSTOLIC BLOOD PRESSURE: 131 MMHG

## 2025-02-05 DIAGNOSIS — K92.0 HEMATEMESIS WITH NAUSEA: Primary | ICD-10-CM

## 2025-02-05 DIAGNOSIS — K92.2 UPPER GI BLEED: Primary | ICD-10-CM

## 2025-02-05 DIAGNOSIS — R13.10 DYSPHAGIA, UNSPECIFIED TYPE: ICD-10-CM

## 2025-02-05 LAB
ALBUMIN SERPL BCP-MCNC: 3.9 G/DL (ref 3.4–4.8)
ALBUMIN/GLOB SERPL: 1.2 {RATIO}
ALP SERPL-CCNC: 93 U/L (ref 40–150)
ALT SERPL W P-5'-P-CCNC: 83 U/L
ANION GAP SERPL CALCULATED.3IONS-SCNC: 13 MMOL/L (ref 7–16)
AST SERPL W P-5'-P-CCNC: 92 U/L (ref 5–34)
BASOPHILS # BLD AUTO: 0.1 K/UL (ref 0–0.2)
BASOPHILS NFR BLD AUTO: 1.3 % (ref 0–1)
BILIRUB SERPL-MCNC: 1.2 MG/DL
BUN SERPL-MCNC: 19 MG/DL (ref 8–26)
BUN/CREAT SERPL: 17 (ref 6–20)
CALCIUM SERPL-MCNC: 9.3 MG/DL (ref 8.8–10)
CHLORIDE SERPL-SCNC: 104 MMOL/L (ref 98–107)
CO2 SERPL-SCNC: 26 MMOL/L (ref 23–31)
CREAT SERPL-MCNC: 1.12 MG/DL (ref 0.72–1.25)
DIFFERENTIAL METHOD BLD: ABNORMAL
EGFR (NO RACE VARIABLE) (RUSH/TITUS): 71 ML/MIN/1.73M2
EOSINOPHIL # BLD AUTO: 0.12 K/UL (ref 0–0.5)
EOSINOPHIL NFR BLD AUTO: 1.5 % (ref 1–4)
ERYTHROCYTE [DISTWIDTH] IN BLOOD BY AUTOMATED COUNT: 13.1 % (ref 11.5–14.5)
GLOBULIN SER-MCNC: 3.2 G/DL (ref 2–4)
GLUCOSE SERPL-MCNC: 92 MG/DL (ref 82–115)
HCT VFR BLD AUTO: 45.8 % (ref 40–54)
HGB BLD-MCNC: 15.3 G/DL (ref 13.5–18)
IMM GRANULOCYTES # BLD AUTO: 0.03 K/UL (ref 0–0.04)
IMM GRANULOCYTES NFR BLD: 0.4 % (ref 0–0.4)
LYMPHOCYTES # BLD AUTO: 2.73 K/UL (ref 1–4.8)
LYMPHOCYTES NFR BLD AUTO: 34.9 % (ref 27–41)
MCH RBC QN AUTO: 32.2 PG (ref 27–31)
MCHC RBC AUTO-ENTMCNC: 33.4 G/DL (ref 32–36)
MCV RBC AUTO: 96.4 FL (ref 80–96)
MONOCYTES # BLD AUTO: 0.74 K/UL (ref 0–0.8)
MONOCYTES NFR BLD AUTO: 9.5 % (ref 2–6)
MPC BLD CALC-MCNC: 10.2 FL (ref 9.4–12.4)
NEUTROPHILS # BLD AUTO: 4.11 K/UL (ref 1.8–7.7)
NEUTROPHILS NFR BLD AUTO: 52.4 % (ref 53–65)
NRBC # BLD AUTO: 0 X10E3/UL
NRBC, AUTO (.00): 0 %
PLATELET # BLD AUTO: 178 K/UL (ref 150–400)
POC OCCULT BLOOD: ABNORMAL
POTASSIUM SERPL-SCNC: 4 MMOL/L (ref 3.5–5.1)
PROT SERPL-MCNC: 7.1 G/DL (ref 5.8–7.6)
RBC # BLD AUTO: 4.75 M/UL (ref 4.6–6.2)
SODIUM SERPL-SCNC: 139 MMOL/L (ref 136–145)
WBC # BLD AUTO: 7.83 K/UL (ref 4.5–11)

## 2025-02-05 PROCEDURE — 36415 COLL VENOUS BLD VENIPUNCTURE: CPT | Performed by: EMERGENCY MEDICINE

## 2025-02-05 PROCEDURE — 85025 COMPLETE CBC W/AUTO DIFF WBC: CPT | Performed by: EMERGENCY MEDICINE

## 2025-02-05 PROCEDURE — 25000003 PHARM REV CODE 250: Performed by: EMERGENCY MEDICINE

## 2025-02-05 PROCEDURE — 99283 EMERGENCY DEPT VISIT LOW MDM: CPT

## 2025-02-05 PROCEDURE — 80053 COMPREHEN METABOLIC PANEL: CPT | Performed by: EMERGENCY MEDICINE

## 2025-02-05 PROCEDURE — 82272 OCCULT BLD FECES 1-3 TESTS: CPT

## 2025-02-05 RX ORDER — PANTOPRAZOLE SODIUM 40 MG/1
80 TABLET, DELAYED RELEASE ORAL
Status: COMPLETED | OUTPATIENT
Start: 2025-02-05 | End: 2025-02-05

## 2025-02-05 RX ORDER — PANTOPRAZOLE SODIUM 20 MG/1
40 TABLET, DELAYED RELEASE ORAL DAILY
Qty: 60 TABLET | Refills: 1 | Status: SHIPPED | OUTPATIENT
Start: 2025-02-05 | End: 2026-02-05

## 2025-02-05 RX ADMIN — PANTOPRAZOLE SODIUM 80 MG: 40 TABLET, DELAYED RELEASE ORAL at 02:02

## 2025-02-05 NOTE — ED PROVIDER NOTES
Encounter Date: 2/5/2025       History     Chief Complaint   Patient presents with    GI Bleeding     PRESENTS TO ER WITH COMPLAINT OF ONE EPISODE OF VOMITING BLOOD. EPISODES OF DARK STOOL. CONCERN FOR BLEEDING ULCER      Patient complains of epigastric discomfort a few days ago associated with about 2 episodes of dark stools in coffee-ground emesis.  That was 2 days ago symptoms have resolved.  Had some epigastric discomfort a couple days ago.  No prior EGDs.  Has had some mild dysphagia in the past and was already thinking about getting him with GI.  He has had 2 colonoscopies in the past both unremarkable.  Were done for screening purposes.  Patient has had recent NSAID use with recent gout exacerbation.      Review of patient's allergies indicates:  No Known Allergies  Past Medical History:   Diagnosis Date    Hyperlipidemia     Hypertension      Past Surgical History:   Procedure Laterality Date    ACHILLES TENDON SURGERY      CORONARY ARTERY BYPASS GRAFT      LEFT HEART CATHETERIZATION N/A 2/8/2022    Procedure: Left heart cath;  Surgeon: Angelo Munoz MD;  Location: [facility] CATH LAB;  Service: Cardiology;  Laterality: N/A;    TONSILLECTOMY       Family History   Problem Relation Name Age of Onset    Heart attack Father       Social History     Tobacco Use    Smoking status: Former    Smokeless tobacco: Never   Substance Use Topics    Alcohol use: Yes    Drug use: No     Review of Systems   Constitutional:  Negative for fever.   HENT:  Negative for sore throat.    Respiratory:  Negative for shortness of breath.    Cardiovascular:  Negative for chest pain.   Gastrointestinal:  Positive for blood in stool and vomiting. Negative for nausea.   Genitourinary:  Negative for dysuria.   Musculoskeletal:  Negative for back pain.   Skin:  Negative for rash.   Neurological:  Negative for weakness.   Hematological:  Does not bruise/bleed easily.       Physical Exam     Initial Vitals [02/05/25 1254]   BP Pulse  Resp Temp SpO2   131/80 80 18 98.3 °F (36.8 °C) 95 %      MAP       --         Physical Exam    Nursing note and vitals reviewed.  Constitutional: He appears well-developed and well-nourished.   HENT:   Head: Normocephalic and atraumatic.   Eyes: EOM are normal. Pupils are equal, round, and reactive to light.   Neck: Neck supple. No thyromegaly present. No JVD present.   Normal range of motion.  Cardiovascular:  Normal rate, regular rhythm, normal heart sounds and intact distal pulses.           No murmur heard.  Pulmonary/Chest: Breath sounds normal. No stridor. No respiratory distress. He has no wheezes.   Abdominal: Abdomen is soft. Bowel sounds are normal. He exhibits no distension. There is no abdominal tenderness.   Musculoskeletal:         General: No tenderness or edema. Normal range of motion.      Cervical back: Normal range of motion and neck supple.     Lymphadenopathy:     He has no cervical adenopathy.   Neurological: He is alert and oriented to person, place, and time. He has normal strength. No cranial nerve deficit or sensory deficit. GCS score is 15. GCS eye subscore is 4. GCS verbal subscore is 5. GCS motor subscore is 6.   Skin: Skin is warm and dry. Capillary refill takes less than 2 seconds. No rash noted.   Psychiatric: He has a normal mood and affect.         Medical Screening Exam   See Full Note    ED Course   Procedures  Labs Reviewed   COMPREHENSIVE METABOLIC PANEL - Abnormal       Result Value    Sodium 139      Potassium 4.0      Chloride 104      CO2 26      Anion Gap 13      Glucose 92      BUN 19      Creatinine 1.12      BUN/Creatinine Ratio 17      Calcium 9.3      Total Protein 7.1      Albumin 3.9      Globulin 3.2      A/G Ratio 1.2      Bilirubin, Total 1.2      Alk Phos 93      ALT 83 (*)     AST 92 (*)     eGFR 71     CBC WITH DIFFERENTIAL - Abnormal    WBC 7.83      RBC 4.75      Hemoglobin 15.3      Hematocrit 45.8      MCV 96.4 (*)     MCH 32.2 (*)     MCHC 33.4      RDW  13.1      Platelet Count 178      MPV 10.2      Neutrophils % 52.4 (*)     Lymphocytes % 34.9      Monocytes % 9.5 (*)     Eosinophils % 1.5      Basophils % 1.3 (*)     Immature Granulocytes % 0.4      nRBC, Auto 0.0      Neutrophils, Abs 4.11      Lymphocytes, Absolute 2.73      Monocytes, Absolute 0.74      Eosinophils, Absolute 0.12      Basophils, Absolute 0.10      Immature Granulocytes, Absolute 0.03      nRBC, Absolute 0.00      Diff Type Auto     POCT OCCULT BLOOD (STOOL) - Abnormal    POC Occult Blood Postive (*)    CBC W/ AUTO DIFFERENTIAL    Narrative:     The following orders were created for panel order CBC auto differential.  Procedure                               Abnormality         Status                     ---------                               -----------         ------                     CBC with Differential[[id number]]       Abnormal            Final result                 Please view results for these tests on the individual orders.          Imaging Results    None          Medications   pantoprazole EC tablet 80 mg (80 mg Oral Given 2/5/25 1418)     Medical Decision Making  Patient well-appearing no current symptoms.  Discussed with GI.  Will have him come for EGD tomorrow morning.  Will start Protonix and prescribe Protonix.  Discussed also with his wife Dr. Rayo    Amount and/or Complexity of Data Reviewed  Labs: ordered.    Risk  Prescription drug management.                                      Clinical Impression:   Final diagnoses:  [K92.2] Upper GI bleed (Primary)        ED Disposition Condition    Discharge Stable          ED Prescriptions       Medication Sig Dispense Start Date End Date Auth. Provider    pantoprazole (PROTONIX) 20 MG tablet Take 2 tablets (40 mg total) by mouth once daily. 60 tablet 2/5/2025 2/5/2026 Jag Kaur MD          Follow-up Information       Follow up With Specialties Details Why Contact Info    Blu Díaz MD Gastroenterology Go on  2/6/2025 8 am [address]  [facility] [address]  [phone]               Jag Kaur MD  02/05/25 7790

## 2025-02-05 NOTE — DISCHARGE INSTRUCTIONS
Take Protonix twice a day for 1 week and then once a day    Avoid any NSAIDs or alcohol    Do not eat or drink anything after midnight tonight.  Go see the GI doctor tomorrow morning at 8:00 a.m. with plans to have a EGD

## 2025-02-06 ENCOUNTER — ANESTHESIA (OUTPATIENT)
Dept: GASTROENTEROLOGY | Facility: HOSPITAL | Age: 70
End: 2025-02-06
Payer: MEDICARE

## 2025-02-06 ENCOUNTER — ANESTHESIA EVENT (OUTPATIENT)
Dept: GASTROENTEROLOGY | Facility: HOSPITAL | Age: 70
End: 2025-02-06
Payer: MEDICARE

## 2025-02-06 ENCOUNTER — HOSPITAL ENCOUNTER (OUTPATIENT)
Dept: GASTROENTEROLOGY | Facility: HOSPITAL | Age: 70
Discharge: HOME | End: 2025-02-06
Attending: INTERNAL MEDICINE | Admitting: INTERNAL MEDICINE
Payer: MEDICARE

## 2025-02-06 VITALS
BODY MASS INDEX: 32.2 KG/M2 | SYSTOLIC BLOOD PRESSURE: 93 MMHG | HEART RATE: 62 BPM | TEMPERATURE: 97 F | TEMPERATURE: 97 F | HEIGHT: 71 IN | DIASTOLIC BLOOD PRESSURE: 45 MMHG | HEART RATE: 65 BPM | SYSTOLIC BLOOD PRESSURE: 93 MMHG | OXYGEN SATURATION: 98 % | OXYGEN SATURATION: 96 % | RESPIRATION RATE: 16 BRPM | WEIGHT: 230 LBS | DIASTOLIC BLOOD PRESSURE: 45 MMHG

## 2025-02-06 DIAGNOSIS — K44.9 HH (HIATUS HERNIA): ICD-10-CM

## 2025-02-06 DIAGNOSIS — K57.30 COLON, DIVERTICULOSIS: ICD-10-CM

## 2025-02-06 DIAGNOSIS — K92.0 HEMATEMESIS WITH NAUSEA: ICD-10-CM

## 2025-02-06 DIAGNOSIS — K29.00 ACUTE SUPERFICIAL GASTRITIS WITHOUT HEMORRHAGE: ICD-10-CM

## 2025-02-06 DIAGNOSIS — K63.5 POLYP OF CECUM: ICD-10-CM

## 2025-02-06 DIAGNOSIS — K22.10 ULCER OF ESOPHAGUS WITHOUT BLEEDING: ICD-10-CM

## 2025-02-06 DIAGNOSIS — R13.19 ESOPHAGEAL DYSPHAGIA: Primary | ICD-10-CM

## 2025-02-06 DIAGNOSIS — Z12.11 SCREENING FOR MALIGNANT NEOPLASM OF COLON: ICD-10-CM

## 2025-02-06 DIAGNOSIS — K21.00 GASTROESOPHAGEAL REFLUX DISEASE WITH ESOPHAGITIS WITHOUT HEMORRHAGE: ICD-10-CM

## 2025-02-06 DIAGNOSIS — K63.5 POLYP OF ASCENDING COLON, UNSPECIFIED TYPE: ICD-10-CM

## 2025-02-06 PROCEDURE — 43450 DILATE ESOPHAGUS 1/MULT PASS: CPT | Mod: 51,,, | Performed by: INTERNAL MEDICINE

## 2025-02-06 PROCEDURE — 45380 COLONOSCOPY AND BIOPSY: CPT | Mod: PT | Performed by: INTERNAL MEDICINE

## 2025-02-06 PROCEDURE — 25000003 PHARM REV CODE 250: Performed by: NURSE ANESTHETIST, CERTIFIED REGISTERED

## 2025-02-06 PROCEDURE — 88305 TISSUE EXAM BY PATHOLOGIST: CPT | Mod: TC,91,SUR | Performed by: INTERNAL MEDICINE

## 2025-02-06 PROCEDURE — 45380 COLONOSCOPY AND BIOPSY: CPT | Mod: PT,,, | Performed by: INTERNAL MEDICINE

## 2025-02-06 PROCEDURE — 88342 IMHCHEM/IMCYTCHM 1ST ANTB: CPT | Mod: 26,,, | Performed by: PATHOLOGY

## 2025-02-06 PROCEDURE — 63600175 PHARM REV CODE 636 W HCPCS: Performed by: NURSE ANESTHETIST, CERTIFIED REGISTERED

## 2025-02-06 PROCEDURE — 43239 EGD BIOPSY SINGLE/MULTIPLE: CPT | Mod: 59 | Performed by: INTERNAL MEDICINE

## 2025-02-06 PROCEDURE — 43239 EGD BIOPSY SINGLE/MULTIPLE: CPT | Mod: 59,,, | Performed by: INTERNAL MEDICINE

## 2025-02-06 PROCEDURE — 27201423 OPTIME MED/SURG SUP & DEVICES STERILE SUPPLY

## 2025-02-06 PROCEDURE — 27000284 HC CANNULA NASAL: Performed by: NURSE ANESTHETIST, CERTIFIED REGISTERED

## 2025-02-06 PROCEDURE — 37000008 HC ANESTHESIA 1ST 15 MINUTES

## 2025-02-06 PROCEDURE — 37000009 HC ANESTHESIA EA ADD 15 MINS

## 2025-02-06 PROCEDURE — D9220A PRA ANESTHESIA: Mod: ,,, | Performed by: NURSE ANESTHETIST, CERTIFIED REGISTERED

## 2025-02-06 PROCEDURE — 27000716 HC OXISENSOR PROBE, ANY SIZE: Performed by: NURSE ANESTHETIST, CERTIFIED REGISTERED

## 2025-02-06 PROCEDURE — 88305 TISSUE EXAM BY PATHOLOGIST: CPT | Mod: 26,,, | Performed by: PATHOLOGY

## 2025-02-06 PROCEDURE — 43450 DILATE ESOPHAGUS 1/MULT PASS: CPT | Performed by: INTERNAL MEDICINE

## 2025-02-06 RX ORDER — SODIUM CHLORIDE 0.9 % (FLUSH) 0.9 %
10 SYRINGE (ML) INJECTION EVERY 6 HOURS PRN
Status: DISCONTINUED | OUTPATIENT
Start: 2025-02-06 | End: 2025-02-07 | Stop reason: HOSPADM

## 2025-02-06 RX ORDER — PROPOFOL 10 MG/ML
VIAL (ML) INTRAVENOUS
Status: DISCONTINUED | OUTPATIENT
Start: 2025-02-06 | End: 2025-02-06

## 2025-02-06 RX ORDER — SODIUM CHLORIDE, SODIUM LACTATE, POTASSIUM CHLORIDE, CALCIUM CHLORIDE 600; 310; 30; 20 MG/100ML; MG/100ML; MG/100ML; MG/100ML
INJECTION, SOLUTION INTRAVENOUS CONTINUOUS
Status: DISCONTINUED | OUTPATIENT
Start: 2025-02-06 | End: 2025-02-07 | Stop reason: HOSPADM

## 2025-02-06 RX ORDER — LIDOCAINE HYDROCHLORIDE 20 MG/ML
INJECTION, SOLUTION EPIDURAL; INFILTRATION; INTRACAUDAL; PERINEURAL
Status: DISCONTINUED | OUTPATIENT
Start: 2025-02-06 | End: 2025-02-06

## 2025-02-06 RX ADMIN — PROPOFOL 100 MG: 10 INJECTION, EMULSION INTRAVENOUS at 09:02

## 2025-02-06 RX ADMIN — SODIUM CHLORIDE: 9 INJECTION, SOLUTION INTRAVENOUS at 09:02

## 2025-02-06 RX ADMIN — PROPOFOL 150 MCG/KG/MIN: 10 INJECTION, EMULSION INTRAVENOUS at 09:02

## 2025-02-06 RX ADMIN — LIDOCAINE HYDROCHLORIDE 75 MG: 20 INJECTION, SOLUTION EPIDURAL; INFILTRATION; INTRACAUDAL; PERINEURAL at 09:02

## 2025-02-06 NOTE — PLAN OF CARE
Pt in recovery resting comfortably. When asked if he wanted family back - he stated he did not.  Updated message sent to family stating pt was resting comfortable in recovery.

## 2025-02-06 NOTE — TRANSFER OF CARE
"Anesthesia Transfer of Care Note    Patient: Antoni Rayo    Procedure(s) Performed: * No procedures listed *    Patient location: GI    Anesthesia Type: MAC    Transport from OR: Transported from OR on room air with adequate spontaneous ventilation. Continuous ECG monitoring in transport. Continuous SpO2 monitoring in transport    Post pain: adequate analgesia    Post assessment: no apparent anesthetic complications    Post vital signs: stable    Level of consciousness: sedated and responds to stimulation    Nausea/Vomiting: no nausea/vomiting    Complications: none    Transfer of care protocol was followedComments: Good SV continue, NAD, VSS, RTRN      Last vitals: Visit Vitals  BP (!) 93/45 (BP Location: Right arm, Patient Position: Lying)   Pulse 67   Temp 36.1 °C (97 °F) (Skin)   Resp 12   Ht 5' 11" (1.803 m)   Wt 104.3 kg (230 lb)   SpO2 98%   BMI 32.08 kg/m²     "

## 2025-02-06 NOTE — ANESTHESIA POSTPROCEDURE EVALUATION
Anesthesia Post Evaluation    Patient: Antoni Rayo    Procedure(s) Performed: * No procedures listed *    Final Anesthesia Type: MAC      Patient location during evaluation: GI PACU  Patient participation: Yes- Able to Participate  Level of consciousness: awake and alert  Post-procedure vital signs: reviewed and stable  Pain management: adequate  Airway patency: patent    PONV status at discharge: No PONV  Anesthetic complications: no      Cardiovascular status: blood pressure returned to baseline and hemodynamically stable  Respiratory status: spontaneous ventilation  Hydration status: euvolemic  Follow-up not needed.  Comments: Refer to nursing notes for pain/elizabeth score upon discharge from recovery.              Vitals Value Taken Time   /67 02/06/25 1042   Temp 36.1 °C (97 °F) 02/06/25 0955   Pulse 51 02/06/25 1043   Resp 18 02/06/25 1043   SpO2 98 % 02/06/25 1042   Vitals shown include unfiled device data.      Event Time   Out of Recovery 10:52:27         Pain/Elizabeth Score: Elizabeth Score: 10 (2/6/2025 10:52 AM)

## 2025-02-06 NOTE — H&P
[facility] - Endoscopy  Gastroenterology  H&P    Patient Name: Antoni Rayo  MRN: [id number]  Admission Date: 2/6/2025  Code Status: Prior    Attending Provider: Blu Díaz MD   Primary Care Physician: No, Primary Doctor  Principal Problem:<principal problem not specified>    Subjective:     History of Present Illness:  This patient is a 69-year-old man who presented to the emergency department yesterday with evidence of GI bleeding.  He has Hemoccult-positive he had blood grossly in his stool and some coffee-ground emesis.  He presents for EGD and colonoscopy.    Past Medical History:   Diagnosis Date    Heart attack     Hyperlipidemia     Hypertension        Past Surgical History:   Procedure Laterality Date    ACHILLES TENDON SURGERY      CORONARY ARTERY BYPASS GRAFT      LEFT HEART CATHETERIZATION N/A 2/8/2022    Procedure: Left heart cath;  Surgeon: Angelo Munoz MD;  Location: [facility] CATH LAB;  Service: Cardiology;  Laterality: N/A;    TONSILLECTOMY         Review of patient's allergies indicates:  No Known Allergies  Family History       Problem Relation (Age of Onset)    Heart attack Father          Tobacco Use    Smoking status: Former    Smokeless tobacco: Never   Substance and Sexual Activity    Alcohol use: Yes     Comment: 3 to 4 drinks a day    Drug use: Yes     Types: Marijuana    Sexual activity: Yes     Partners: Female     Review of Systems   Constitutional: Negative.    HENT: Negative.     Respiratory: Negative.     Cardiovascular: Negative.    Gastrointestinal:  Positive for blood in stool, nausea and vomiting.     Objective:     Vital Signs (Most Recent):  Temp: 98 °F (36.7 °C) (02/06/25 0901)  Pulse: (!) 50 (02/06/25 0901)  Resp: 13 (02/06/25 0901)  BP: 128/68 (02/06/25 0901)  SpO2: 98 % (02/06/25 0901) Vital Signs (24h Range):  Temp:  [98 °F (36.7 °C)-98.3 °F (36.8 °C)] 98 °F (36.7 °C)  Pulse:  [50-80] 50  Resp:  [13-18] 13  SpO2:  [95 %-98 %] 98 %  BP: (128-131)/(68-80) 128/68      Weight: 104.3 kg (230 lb) (02/06/25 0901)  Body mass index is 32.08 kg/m².    No intake or output data in the 24 hours ending 02/06/25 0924    Lines/Drains/Airways       Peripheral Intravenous Line  Duration                  Peripheral IV - Single Lumen 02/06/25 0901 22 G Left;Posterior Hand <1 day                    Physical Exam  Constitutional:       General: He is not in acute distress.     Appearance: Normal appearance. He is well-developed. He is not ill-appearing.   HENT:      Head: Normocephalic and atraumatic.      Nose: Nose normal.   Eyes:      Pupils: Pupils are equal, round, and reactive to light.   Cardiovascular:      Rate and Rhythm: Normal rate and regular rhythm.   Pulmonary:      Effort: Pulmonary effort is normal.      Breath sounds: Normal breath sounds. No wheezing.   Abdominal:      General: Abdomen is flat. Bowel sounds are normal. There is no distension.      Palpations: Abdomen is soft.      Tenderness: There is no abdominal tenderness. There is no guarding.   Skin:     General: Skin is warm and dry.      Coloration: Skin is not jaundiced.   Neurological:      Mental Status: He is alert.   Psychiatric:         Attention and Perception: Attention normal.         Mood and Affect: Affect normal.         Speech: Speech normal.         Behavior: Behavior is cooperative.      Comments: Pt was calm while speaking.         Significant Labs:  CBC:   Recent Labs   Lab 02/05/25  1316   WBC 7.83   HGB 15.3   HCT 45.8        CMP:   Recent Labs   Lab 02/05/25  1316   GLU 92   CALCIUM 9.3   ALBUMIN 3.9   PROT 7.1      K 4.0   CO2 26      BUN 19   CREATININE 1.12   ALKPHOS 93   ALT 83*   AST 92*   BILITOT 1.2       Significant Imaging:  Imaging results within the past 24 hours have been reviewed.    Assessment/Plan:     There are no hospital problems to display for this patient.        Impression: GI bleed  Plan: EGD and colonoscopy    Blu Díaz MD  Gastroenterology  [facility]  - Endoscopy

## 2025-02-06 NOTE — DISCHARGE INSTRUCTIONS
Procedure Date  2/6/25     Impression  Overall Impression:   Performed forceps biopsies in the stomach  Performed forceps biopsies in the esophagus  Dilated with Zapien dilator  Esophagitis in the lower third of the esophagus; performed cold forceps biopsy  Erythematous mucosa in the fundus of the stomach and antrum; performed cold forceps biopsy  Small hiatal hernia  Mid and distal esophagus was erythematous with linear ulcerations consistent with reflux esophagitis.  Biopsies were obtained.  The esophagus was dilated with a 48 Bengali Zapien dilator.  A small hiatal hernia was noted.  Gastritis was present in the stomach and biopsies were obtained from the antrum and fundus.  The pyloric channel was patent.  Mucosa of the duodenum had inflammation without ulcers.  Impression:  Reflux esophagitis, esophageal dysphagia, hiatus hernia, gastritis, status post dilation of the esophagus.  Recommendation  Await pathology results, due: 2/10/2025      Disposition: The patient remained in the GI endoscopy room for colonoscopy.  He can resume diet today, no driving until tomorrow.  Avoid nonsteroidal anti-inflammatories, continue Protonix 40 mg per day for at least 8 weeks.  Repeat dilation of the esophagus p.r.n..  Follow up pathology results next week.  Follow up with PCP as scheduled, return GI clinic p.r.n..     Indication  Hematemesis with nausea, esophageal dysphagia      Procedure Date  2/6/25     Impression  Overall Impression:   2 polyps; performed cold forceps biopsy  Diverticulosis in the ascending colon, descending colon and sigmoid colon  Digital rectal exam revealed no mass.  The colon was examined from the rectum to the cecum and some retained stool was present so small polyps can not be excluded.  Pandiverticulosis was present.  One polyp was removed from the cecum with biopsy and another from the ascending colon with biopsy.  Impression: Screening for colon neoplasm, 2 colon polyps were removed during this  exam, colon diverticulosis.     Recommendation  Await pathology results, due: 2/10/2025   Repeat colonoscopy in 5 years      Disposition: Discharge patient to home in stable condition.  High-fiber diet.  No driving until tomorrow.  Follow up pathology results next week.  Follow up with PCP as scheduled, return to GI clinic p.r.n..     Indication  GI bleed, screening for colon neoplasm

## 2025-02-06 NOTE — ANESTHESIA PREPROCEDURE EVALUATION
02/06/2025  Antoni Rayo is a 69 y.o., male.    Past Medical History:   Diagnosis Date    Hyperlipidemia     Hypertension        Past Surgical History:   Procedure Laterality Date    ACHILLES TENDON SURGERY      CORONARY ARTERY BYPASS GRAFT      LEFT HEART CATHETERIZATION N/A 2/8/2022    Procedure: Left heart cath;  Surgeon: Angelo Munoz MD;  Location: [facility] CATH LAB;  Service: Cardiology;  Laterality: N/A;    TONSILLECTOMY         Family History   Problem Relation Name Age of Onset    Heart attack Father         Social History     Socioeconomic History    Marital status:    Tobacco Use    Smoking status: Former    Smokeless tobacco: Never   Substance and Sexual Activity    Alcohol use: Yes    Drug use: No    Sexual activity: Yes     Partners: Female       Current Outpatient Medications   Medication Sig Dispense Refill    aspirin 81 MG Chew Take 81 mg by mouth once daily.      colchicine (COLCRYS) 0.6 mg tablet Take 2 tab at onset of gout pain. If needed, may take 1 tab one hour after initial dose. Do not exceed 3 tabs in 72 hours. 12 tablet 1    evolocumab (REPATHA SURECLICK) 140 mg/mL PnIj Inject 1 mL (140 mg total) into the skin every 14 (fourteen) days. 2 each 3    ezetimibe (ZETIA) 10 mg tablet Take 1 tablet (10 mg total) by mouth every evening. 30 tablet 0    ketoconazole (NIZORAL) 2 % shampoo Apply topically twice a week. Allow to sit on scalp for 5- 10 minutes before rinsing 120 mL 11    metroNIDAZOLE (METROGEL) 0.75 % gel Apply topically 2 (two) times daily. 45 g 11    oxymetazoline (RHOFADE) 1 % Crea Apply 1 application topically daily as needed (facial redness). 30 g 11    pantoprazole (PROTONIX) 20 MG tablet Take 2 tablets (40 mg total) by mouth once daily. 60 tablet 1     No current facility-administered medications for this encounter.       Review of patient's allergies  indicates:  No Known Allergies   Pre-op Assessment    I have reviewed the Patient Summary Reports.     I have reviewed the Nursing Notes. I have reviewed the NPO Status.   I have reviewed the Medications.     Review of Systems  Anesthesia Hx:  No problems with previous Anesthesia             Denies Family Hx of Anesthesia complications.    Denies Personal Hx of Anesthesia complications.                    Hematology/Oncology:    Oncology Normal                                   EENT/Dental:  EENT/Dental Normal           Cardiovascular:     Hypertension   CAD           hyperlipidemia                               Renal/:  Renal/ Normal                 Musculoskeletal:  Musculoskeletal Normal                Neurological:    Neuromuscular Disease,                                   Dermatological:  Skin Normal    Psych:  Psychiatric Normal                    Physical Exam  General: Well nourished, Alert, Oriented and Cooperative    Airway:  Mouth Opening: Normal  Neck ROM: Normal ROM    Chest/Lungs:  Normal Respiratory Rate    Heart:  Rate: Normal        Anesthesia Plan  Type of Anesthesia, risks & benefits discussed:    Anesthesia Type: Gen Natural Airway, MAC  Intra-op Monitoring Plan: Standard ASA Monitors  Post Op Pain Control Plan: multimodal analgesia and IV/PO Opioids PRN  Induction:  IV  Informed Consent: Informed consent signed with the Patient and all parties understand the risks and agree with anesthesia plan.  All questions answered. Patient consented to blood products? Yes  ASA Score: 3  Day of Surgery Review of History & Physical: I have interviewed and examined the patient. I have reviewed the patient's H&P dated: There are no significant changes.     Ready For Surgery From Anesthesia Perspective.     .

## 2025-02-07 LAB
ESTROGEN SERPL-MCNC: NORMAL PG/ML
INSULIN SERPL-ACNC: NORMAL U[IU]/ML
LAB AP GROSS DESCRIPTION: NORMAL
LAB AP LABORATORY NOTES: NORMAL
T3RU NFR SERPL: NORMAL %

## 2025-02-07 NOTE — PROGRESS NOTES
EGD biopsy showed gastritis and reflux esophagitis.  Colon polyps were adenomas and serrated adenomas.  Recommendation: Avoid nonsteroidal anti-inflammatories, continue Protonix for at least 8 weeks, repeat dilation of the esophagus p.r.n., repeat colonoscopy in 3 years due to the serrated adenoma polyp.

## 2025-02-10 ENCOUNTER — TELEPHONE (OUTPATIENT)
Dept: GASTROENTEROLOGY | Facility: CLINIC | Age: 70
End: 2025-02-10
Payer: MEDICARE

## 2025-02-10 NOTE — TELEPHONE ENCOUNTER
Left voicemail to call back or check portal for results and recommendations. 3 year recall placed on chart.              ----- Message from Blu Díaz MD sent at 2/7/2025  3:29 PM CST -----  EGD biopsy showed gastritis and reflux esophagitis.  Colon polyps were adenomas and serrated adenomas.  Recommendation: Avoid nonsteroidal anti-inflammatories, continue Protonix for at least 8 weeks, repeat dilation of the esophagus p.r.n., repeat colonoscopy in 3 years due to the serrated adenoma polyp.

## 2025-02-19 ENCOUNTER — TELEPHONE (OUTPATIENT)
Dept: GASTROENTEROLOGY | Facility: CLINIC | Age: 70
End: 2025-02-19
Payer: MEDICARE

## 2025-02-25 ENCOUNTER — TELEPHONE (OUTPATIENT)
Dept: GASTROENTEROLOGY | Facility: CLINIC | Age: 70
End: 2025-02-25
Payer: MEDICARE

## (undated) DEVICE — KIT INTRAOPERATIVE ULTRASOUND PROBE COVER 6INX96IN

## (undated) DEVICE — SHEATH INTRODUCER 6FR 10CM X 0.038 PINNACLE

## (undated) DEVICE — KIT MICROINTRODUCER 4FR MINI STICK II

## (undated) DEVICE — SYRINGE 150CC INJECTABLE POWER

## (undated) DEVICE — CLIPPER SURGICAL BLADE ASSEMBLY STERILE SNGL USE

## (undated) DEVICE — SENSOR PULSE OX ADULT

## (undated) DEVICE — GLOVE SURGICAL PROTEXIS PI SIZE 7.5

## (undated) DEVICE — APPLICATOR CHLORAPREP LITE ORANGE 10.5ML STERILE

## (undated) DEVICE — TUBING CAPNOLINE PLUS SMART 02 CONNECTOR(ORDER 65110)

## (undated) DEVICE — POSITIONER ULNAR NERVE

## (undated) DEVICE — SEAL ANGIO 6FR VIP - VASCULAR CLOSURE DEVICE BX/10

## (undated) DEVICE — GLOVE SURG BIOGEL SZ 7.5

## (undated) DEVICE — DRESSING IV TEGADERM 10X12CM

## (undated) DEVICE — CDS ANGIOGRAPHY PACK

## (undated) DEVICE — CATH IMPULSE 6FR MPA-1

## (undated) DEVICE — BAG-A-JET FLUID DISPENSER SYSTEM

## (undated) DEVICE — CUFF BP DISPOSABLE SOFT ADULT LONG

## (undated) DEVICE — ISOVUE 370 100ML

## (undated) DEVICE — STOPCOCK 3-WAY ROTATING

## (undated) DEVICE — SET IV EXTENSION 42IN W/2 PORT CLEARLINK

## (undated) DEVICE — CATH IMPULSE 6FR MULTIPACK